# Patient Record
Sex: FEMALE | Race: BLACK OR AFRICAN AMERICAN | NOT HISPANIC OR LATINO | Employment: FULL TIME | URBAN - METROPOLITAN AREA
[De-identification: names, ages, dates, MRNs, and addresses within clinical notes are randomized per-mention and may not be internally consistent; named-entity substitution may affect disease eponyms.]

---

## 2024-08-19 ENCOUNTER — OFFICE VISIT (OUTPATIENT)
Dept: FAMILY MEDICINE | Facility: CLINIC | Age: 39
End: 2024-08-19
Payer: COMMERCIAL

## 2024-08-19 ENCOUNTER — PATIENT MESSAGE (OUTPATIENT)
Dept: FAMILY MEDICINE | Facility: CLINIC | Age: 39
End: 2024-08-19

## 2024-08-19 VITALS
BODY MASS INDEX: 34.44 KG/M2 | DIASTOLIC BLOOD PRESSURE: 84 MMHG | HEART RATE: 95 BPM | WEIGHT: 201.75 LBS | TEMPERATURE: 99 F | OXYGEN SATURATION: 99 % | HEIGHT: 64 IN | SYSTOLIC BLOOD PRESSURE: 132 MMHG

## 2024-08-19 DIAGNOSIS — Z12.31 SCREENING MAMMOGRAM FOR BREAST CANCER: ICD-10-CM

## 2024-08-19 DIAGNOSIS — L65.9 HAIR LOSS DISORDER: ICD-10-CM

## 2024-08-19 DIAGNOSIS — Z00.00 ENCOUNTER FOR ANNUAL GENERAL MEDICAL EXAMINATION WITHOUT ABNORMAL FINDINGS IN ADULT: Primary | ICD-10-CM

## 2024-08-19 DIAGNOSIS — M54.16 LUMBAR RADICULOPATHY, CHRONIC: ICD-10-CM

## 2024-08-19 DIAGNOSIS — M79.10 MYALGIA: ICD-10-CM

## 2024-08-19 DIAGNOSIS — G89.29 CHRONIC BILATERAL LOW BACK PAIN WITH BILATERAL SCIATICA: ICD-10-CM

## 2024-08-19 DIAGNOSIS — M54.42 CHRONIC BILATERAL LOW BACK PAIN WITH BILATERAL SCIATICA: ICD-10-CM

## 2024-08-19 DIAGNOSIS — F32.1 CURRENT MODERATE EPISODE OF MAJOR DEPRESSIVE DISORDER, UNSPECIFIED WHETHER RECURRENT: ICD-10-CM

## 2024-08-19 DIAGNOSIS — M25.562 LEFT ANTERIOR KNEE PAIN: ICD-10-CM

## 2024-08-19 DIAGNOSIS — J45.20 MILD INTERMITTENT ASTHMA WITHOUT COMPLICATION: ICD-10-CM

## 2024-08-19 DIAGNOSIS — M54.41 CHRONIC BILATERAL LOW BACK PAIN WITH BILATERAL SCIATICA: ICD-10-CM

## 2024-08-19 PROCEDURE — 99215 OFFICE O/P EST HI 40 MIN: CPT | Mod: S$GLB,,, | Performed by: STUDENT IN AN ORGANIZED HEALTH CARE EDUCATION/TRAINING PROGRAM

## 2024-08-19 PROCEDURE — 99999 PR PBB SHADOW E&M-EST. PATIENT-LVL V: CPT | Mod: PBBFAC,,, | Performed by: STUDENT IN AN ORGANIZED HEALTH CARE EDUCATION/TRAINING PROGRAM

## 2024-08-19 RX ORDER — IBUPROFEN 200 MG
200 TABLET ORAL EVERY 6 HOURS PRN
COMMUNITY

## 2024-08-19 RX ORDER — DULOXETIN HYDROCHLORIDE 60 MG/1
60 CAPSULE, DELAYED RELEASE ORAL DAILY
Qty: 30 CAPSULE | Refills: 1 | Status: SHIPPED | OUTPATIENT
Start: 2024-09-09 | End: 2024-11-08

## 2024-08-19 RX ORDER — MINOXIDIL 2 %
SOLUTION, NON-ORAL TOPICAL 2 TIMES DAILY
Qty: 60 ML | Refills: 4 | Status: SHIPPED | OUTPATIENT
Start: 2024-08-19 | End: 2025-02-15

## 2024-08-19 RX ORDER — ALBUTEROL SULFATE 90 UG/1
2 INHALANT RESPIRATORY (INHALATION) EVERY 6 HOURS PRN
Qty: 51 EACH | Refills: 2 | Status: SHIPPED | OUTPATIENT
Start: 2024-08-19 | End: 2025-08-19

## 2024-08-19 RX ORDER — BETAMETHASONE DIPROPIONATE 0.5 MG/G
CREAM TOPICAL 2 TIMES DAILY
Qty: 45 G | Refills: 4 | Status: SHIPPED | OUTPATIENT
Start: 2024-08-19 | End: 2025-02-15

## 2024-08-19 RX ORDER — DULOXETIN HYDROCHLORIDE 30 MG/1
30 CAPSULE, DELAYED RELEASE ORAL DAILY
Qty: 7 CAPSULE | Refills: 0 | Status: SHIPPED | OUTPATIENT
Start: 2024-08-19 | End: 2024-08-26

## 2024-08-20 RX ORDER — MELOXICAM 15 MG/1
15 TABLET ORAL DAILY
Qty: 30 TABLET | Refills: 5 | Status: SHIPPED | OUTPATIENT
Start: 2024-08-20 | End: 2025-02-16

## 2024-08-21 ENCOUNTER — TELEPHONE (OUTPATIENT)
Dept: OBSTETRICS AND GYNECOLOGY | Facility: CLINIC | Age: 39
End: 2024-08-21
Payer: COMMERCIAL

## 2024-08-21 NOTE — TELEPHONE ENCOUNTER
----- Message from Jany Mcclelland sent at 8/21/2024  3:06 PM CDT -----  Regarding: Sooner appt  Contact: pt  Type:  Sooner Appointment Request    Caller is requesting a sooner appointment.  Caller declined first available appointment listed below.  Caller will not accept being placed on the waitlist and is requesting a message be sent to doctor.    Name of Caller:pt    Best Call Back Number:[phone]    Additional Information: pt has an appt 8/30 at 2:00.  Pt asking for an earlier appt that day.  She has a conflict.

## 2024-08-22 ENCOUNTER — TELEPHONE (OUTPATIENT)
Dept: FAMILY MEDICINE | Facility: CLINIC | Age: 39
End: 2024-08-22
Payer: COMMERCIAL

## 2024-08-22 NOTE — TELEPHONE ENCOUNTER
Spoke to patient regarding scheduled imaging for today as insurance has not authorized yet. States she spoke with someone earlier regarding the same & provided additional  insurance. Was under the impression it was resolved. Will check back w/radiology for confirmation.

## 2024-08-30 ENCOUNTER — OFFICE VISIT (OUTPATIENT)
Dept: OBSTETRICS AND GYNECOLOGY | Facility: CLINIC | Age: 39
End: 2024-08-30
Payer: COMMERCIAL

## 2024-08-30 ENCOUNTER — HOSPITAL ENCOUNTER (OUTPATIENT)
Dept: RADIOLOGY | Facility: CLINIC | Age: 39
Discharge: HOME | End: 2024-08-30
Attending: STUDENT IN AN ORGANIZED HEALTH CARE EDUCATION/TRAINING PROGRAM
Payer: COMMERCIAL

## 2024-08-30 ENCOUNTER — TELEPHONE (OUTPATIENT)
Dept: FAMILY MEDICINE | Facility: CLINIC | Age: 39
End: 2024-08-30
Payer: COMMERCIAL

## 2024-08-30 VITALS
SYSTOLIC BLOOD PRESSURE: 110 MMHG | BODY MASS INDEX: 33.8 KG/M2 | DIASTOLIC BLOOD PRESSURE: 70 MMHG | HEIGHT: 64 IN | WEIGHT: 198 LBS

## 2024-08-30 DIAGNOSIS — Z12.31 SCREENING MAMMOGRAM FOR BREAST CANCER: ICD-10-CM

## 2024-08-30 DIAGNOSIS — M25.562 LEFT ANTERIOR KNEE PAIN: ICD-10-CM

## 2024-08-30 DIAGNOSIS — Z01.419 WELL WOMAN EXAM: Primary | ICD-10-CM

## 2024-08-30 PROCEDURE — 77067 SCR MAMMO BI INCL CAD: CPT | Mod: TC,PO

## 2024-08-30 PROCEDURE — 99999 PR PBB SHADOW E&M-EST. PATIENT-LVL III: CPT | Mod: PBBFAC,,, | Performed by: GENERAL PRACTICE

## 2024-08-30 PROCEDURE — 99386 PREV VISIT NEW AGE 40-64: CPT | Mod: S$GLB,,, | Performed by: GENERAL PRACTICE

## 2024-08-30 PROCEDURE — 73560 X-RAY EXAM OF KNEE 1 OR 2: CPT | Mod: 26,LT,, | Performed by: RADIOLOGY

## 2024-08-30 PROCEDURE — 73560 X-RAY EXAM OF KNEE 1 OR 2: CPT | Mod: TC,FY,PO,LT

## 2024-08-30 NOTE — TELEPHONE ENCOUNTER
----- Message from Malka Reveles sent at 8/30/2024 11:27 AM CDT -----  Regarding: CT Code    Good morning,        Mrs Jackson came in today. Her CT was denied due to issues with the code. Please give her a call to discuss options and possibly assist with getting her approved. Thank you

## 2024-08-30 NOTE — PROGRESS NOTES
HISTORY OF THE PRESENT ILLNESS    08/31/2024  Gabriel Martell is a 39 y.o. here for WWE, establishing care.  Moved back to [place] after having been in [place] for a while.  Recently saw Dr. Alex Jarrett who collected a PAP and dx'ed with a yeast infection.  Saw him b/c she was having a hard time getting scheduled with an [facility].    Recently established with Dr. Murillo who dx'ed her with depression (patient says more anxiety to her), and started on cymbalta.  Patient also concerned with hair loss - has a derm referral pending.  Also concerned that she hasn't been able to maintain a healthy weight as she has before.  Says she didn't get to explore this with Dr. Murillo as much as she would have liked to.  Wondering if it could be her hormones.    G'sP's: G0  LMP: 21 AUG  Relationship:  and sexually active  Contraception: Vasectomy  PAP Hx: no h/o abnormals  LAST PAP: PAP neg / Date: 6/28/2019 --> had one recently with Dr. Jarrett  MMG (-) = scheduled today     GYNECOLOGIC HISTORY  Cervical CA Screen / Infectious   PAP Hx: no h/o abnormals  Genital HSV: no  STD Hx: no past history   Bleeding   Menarche: 11 yoa  Period duration: 6 days  # Heavy Days: 4  Pad/tampon ? on heavy days: 4x  Intermenstrual bleeding: No  Period frequency:regular every 28-30 days   Pain   Dysmenorrhea: yes  Non-menstrual pelvic pressure/pain: Yes  Dyspareunia: Yes: only sometimes   Other   Vasomotor Sxs: denies  Vaginal dryness: denies     OBSTETRIC HISTORY  G0    PAST MEDICAL HISTORY  BMI 34  Fibroids  Anxiety / depression   Asthma    PAST SURGICAL HISTORY  ----------------------------    Breast biopsy    Colposcopy    Laproscopy    Laparotomy Myomectomy 2007  Lx for pain    ALLERGIES  Review of patient's allergies indicates:   Allergen Reactions    Pollen extracts Other (See Comments)     MEDICATIONS  Current Outpatient Medications   Medication Instructions    albuterol (PROVENTIL/VENTOLIN HFA) 90 mcg/actuation inhaler 2  puffs, Inhalation, Every 6 hours PRN    B.coagul,subtilis/inulin/vit C (CULTURELLE PROBIOTIC-PREBIOTIC ORAL)     betamethasone dipropionate 0.05 % cream Topical (Top), 2 times daily, Use with Minoxidil    [START ON 9/9/2024] DULoxetine (CYMBALTA) 60 mg, Oral, Daily    ibuprofen (ADVIL,MOTRIN) 200 mg, Oral, Every 6 hours PRN    loratadine 10 mg Cap     MELATONIN ORAL Oral    meloxicam (MOBIC) 15 mg, Oral, Daily, For back pain/Knee pain, take with food. Do not take with Ibuprofen/Motrin/Aleve    minoxidiL (ROGAINE) 2 % external solution Topical (Top), 2 times daily, For hair loss    multivitamin capsule 1 capsule, Oral, Daily     SOCIAL HISTORY  Lives with:   Smoker: non-smoker  Alcohol: denies  Drugs: denies  Domestic Violence: no  Occupation:  , out in the field by herself , physically demanding job    FAMILY HISTORY  BLEEDING or  CLOTTING DISORDERS: none  BREAST CA: none  UTERINE CA: none  OVARIAN CA: none  COLON CA: none    --------------------------------------------------------------------------------------------------------------    PHYSICAL EXAM  Vitals:    08/30/24 1013   BP: 110/70     GEN = alert/oriented, nad, pleasant  HEENT = sclera anicteric, EOM grossly normal  BREASTS = deferred, no concerns  CV = BP and HR as per vitals  PULM = normal respiratory effort   = deferred, no concerns and just had exam with Dr. Jarrett    ASSESSMENT AND PLAN:  39 y.o. for [employer] / establishing care.  Multiple non-GYN concerns as above.  Discussed that for weight problems I'll often order a TSH (already ordered by Dr. Murillo), but I generally don't diagnose or manage weight loss problems.  With her monthly menstrual cycles she is unlikely to have any abnormal hormone levels from a GYN perspective.  No indication for testing at this time.  Has CPP with Hx of pain scope and prior myomectomy.    Records request (mostly for PAP smear and any relevant findings/studies) from Dr. Jarrett's office  Cervical Cancer  screening:  awaiting report from recent PAP smear  Mammogram: scheduled soon  Encouraged self breast awareness; RTC for breast concerns  Encouraged reaching out to Dr. Murillo about next steps for weight loss eval/plan  Encouraged to schedule with Derm re hair loss  RTC for periodic GYN exam, sooner prliz Navarro MD

## 2024-09-03 ENCOUNTER — OFFICE VISIT (OUTPATIENT)
Dept: FAMILY MEDICINE | Facility: CLINIC | Age: 39
End: 2024-09-03
Payer: COMMERCIAL

## 2024-09-03 DIAGNOSIS — M25.561 CHRONIC PAIN OF RIGHT KNEE: ICD-10-CM

## 2024-09-03 DIAGNOSIS — M23.52 RECURRENT LEFT KNEE INSTABILITY: Primary | ICD-10-CM

## 2024-09-03 DIAGNOSIS — G89.29 CHRONIC PAIN OF RIGHT KNEE: ICD-10-CM

## 2024-09-03 PROCEDURE — 99213 OFFICE O/P EST LOW 20 MIN: CPT | Mod: 95,,, | Performed by: STUDENT IN AN ORGANIZED HEALTH CARE EDUCATION/TRAINING PROGRAM

## 2024-09-03 PROCEDURE — G2211 COMPLEX E/M VISIT ADD ON: HCPCS | Mod: 95,,, | Performed by: STUDENT IN AN ORGANIZED HEALTH CARE EDUCATION/TRAINING PROGRAM

## 2024-09-03 NOTE — PROGRESS NOTES
The patient location is:  [facility]  The chief complaint leading to consultation is:   Chief Complaint   Patient presents with    Left knee instability     Worsening left knee instability, popping clicking sound    Lab results     Results review     Total time spent with patient: 15 min     Visit type: Virtual visit with synchronous audio and video  Each patient to whom he or she provides medical services by telemedicine is:  (1) informed of the relationship between the physician and patient and the respective role of any other health care provider with respect to management of the patient; and (2) notified that he or she may decline to receive medical services by telemedicine and may withdraw from such care at any time.    This service was not originating from a related E/M service provided within the previous 7 days nor will  to an E/M service or procedure within the next 24 hours or my soonest available appointment.  Prevailing standard of care was able to be met in this synchronous audio and video visit    Subjective:    Chief Complaint:   Chief Complaint   Patient presents with    Left knee instability     Worsening left knee instability, popping clicking sound    Lab results     Results review     274}  HPI:  39 y.o. female reports worsening left knee instability.  Recent x-ray performed this negative for arthritis.  However she does continue to experience pain, a popping and clicking sound as well as difficulty with ambulation.     She also reports worsening right knee pain however symptoms are greatest on the left.    Labs reviewed and all concerns addressed.      The HPI and pertinent ROS is included in the Diagnostic Impression Remarks section at the end of the note. Please see below for further details.     The following portions of the patient's history were reviewed and updated as appropriate: allergies, current medications, past family history, past medical history, past social  "history, past surgical history and problem list.  Past Medical History:   Diagnosis Date    Abnormal Pap smear of cervix 2006    Asthma     Breast disorder      Past Surgical History:   Procedure Laterality Date    BREAST BIOPSY Left 2011    COLPOSCOPY      laproscopy        Social History  Social History     Tobacco Use    Smoking status: Never    Smokeless tobacco: Never   Substance Use Topics    Alcohol use: No    Drug use: No     Family History   Problem Relation Name Age of Onset    Diabetes Father       Review of patient's allergies indicates:   Allergen Reactions    Pollen extracts Other (See Comments)     274}  Physical Examination  LMP 08/21/2024 (Exact Date)   Wt Readings from Last 3 Encounters:   08/30/24 89.8 kg (197 lb 15.6 oz)   08/19/24 91.5 kg (201 lb 11.5 oz)   08/23/19 73.1 kg (161 lb 2.5 oz)     BP Readings from Last 3 Encounters:   08/30/24 110/70   08/19/24 132/84   08/23/19 116/67     Estimated body mass index is 33.98 kg/m² as calculated from the following:    Height as of 8/30/24: 5' 4" (1.626 m).    Weight as of 8/30/24: 89.8 kg (197 lb 15.6 oz).       CONSTITUTIONAL: No apparent distress. Does not appear acutely ill or septic. Appears adequately hydrated.  PULM: Breathing unlabored.  PSYCHIATRIC: Alert and conversant and grossly oriented. Mood is grossly neutral. Affect appropriate. Judgment and insight grossly intact.  Integument: normal coloration and turgor, no rashes, no suspicious skin lesions noted.    Data reviewed  Previous medical records reviewed and summarized in HPI.   274}    Laboratory  I have reviewed old labs below:  Lab Results   Component Value Date    WBC 4.22 08/30/2024    HGB 13.1 08/30/2024    HCT 41.7 08/30/2024    MCV 91 08/30/2024     08/30/2024     08/30/2024    K 4.2 08/30/2024     08/30/2024    CALCIUM 9.6 08/30/2024    CO2 26 08/30/2024    GLU 88 08/30/2024    BUN 13 08/30/2024    CREATININE 1.0 08/30/2024    ANIONGAP 8 08/30/2024    " "ESTGFRAFRICA >60 08/23/2019    EGFRNONAA >60 08/23/2019    PROT 7.8 08/30/2024    ALBUMIN 4.2 08/30/2024    BILITOT 0.5 08/30/2024    ALKPHOS 66 08/30/2024    ALT 10 08/30/2024    AST 20 08/30/2024    CHOL 159 08/30/2024    TRIG 52 08/30/2024    HDL 51 08/30/2024    LDLCALC 97.6 08/30/2024    TSH 0.878 08/30/2024    HGBA1C 5.2 08/30/2024     Lab reviewed by me: Particular labs of significance that I will monitor, workup, or treat to improve are mentioned below in diagnostic impression remarks.  274}  Imaging/EKG: I have reviewed the pertinent results/findings and my personal findings are noted below in diagnostic impression remarks.     Assessment/Plan  Gabriel Dong [facility] COLEYazanl is a 39 y.o. female who presents to clinic with:    1. Recurrent left knee instability    2. Chronic pain of right knee         Diagnostic Impression Remarks + HPI     Documentation entered by me for this encounter may have been done in part using speech-recognition technology. Although I have made an effort to ensure accuracy, "sound like" errors may exist and should be interpreted in context.           This is the extent of the patient's complaints at this present time. She denies chest pain upon exertion, dyspnea, nausea, vomiting, diaphoresis, and syncope. No pleuritic chest pain, unilateral leg swelling, calf tenderness, or calf pain.     Gabriel will return to clinic in a few months for further workup and reassessment or sooner as needed. She was instructed to call the clinic or go to the emergency department if her symptoms do not improve, worsens, or if new symptoms develop. As we discussed that symptoms could worsen over the next 24 hours she was advised that if any increased swelling, pain, or numbness arise to go immediately to the ED. Patient knows to call any time if an emergency arises. Shared decision making occurred and she verbalized understanding in agreement with this plan.   274}  BMI Goal    Counseled patient on " "her ideal body weight, health consequences of being obese and current recommendations including weekly exercise and a heart healthy diet.  She is aware that ideal BMI < 25  Estimated body mass index is 33.98 kg/m² as calculated from the following:    Height as of 8/30/24: 5' 4" (1.626 m).    Weight as of 8/30/24: 89.8 kg (197 lb 15.6 oz).     She was counseled about the importance of healthy dietary habits as well as routine physical activity and exercise for better health outcomes. I also discussed the importance of cancer screening.     Medication Monitoring    In today's visit, monitoring for drug toxicity was accomplished. Proper use of medications was also discussed.     Counseling    Eat Less Unhealthy Fat   -Cut back on saturated fats and trans (also called hydrogenated) fats. A diet thats high in these fats increases your bad cholesterol. Its not enough to just cut back on foods containing cholesterol.   -Eat about 2 servings of fish per week. Most fish contain omega-3 fatty acids. These help lower blood cholesterol.   -Eat more whole grains and soluble fiber (such as oat bran). These lower overall cholesterol.   -Counseled that most cholesterol is made in the liver and only a minority comes from diet.    Be Active   -Choose an activity you enjoy. Walking, swimming, and riding a bike are some good ways to be active.   -Start at a level where you feel comfortable. Increase your time and pace a little each week.   -Work up to 30 minutes on most days. You can break this up into three 10-minute periods.   -Remember, some activity is better than none.   -If you havent been exercising regularly, start slowly. Check with your doctor to make sure the exercise plan is right for you.     Take Medication As Directed: Many patients need medication to get their LDL levels to a safe level. Medication to lower cholesterol levels is effective and safe. (But taking medication is not a substitute for exercise or watching " your diet!).    I discussed imaging findings, diagnosis, possibilities, treatment options, medications, risks, and benefits. She had many questions regarding the options and long-term effects. All questions were answered. She expressed understanding after counseling regarding the diagnosis and recommendations. She was capable and demonstrated competence with understanding of these options. Shared decision making was performed resulting in her choosing the current treatment plan.     I also discussed the importance of close follow up to discuss labs, change or modify her medications if needed, monitor side effects, and further evaluation of medical problems.     Additional workup planned: see labs ordered below.    See below for labs and meds ordered with associated diagnosis    Recurrent left knee instability  Comments:  Worsening left knee pain with instability, +popping/clicking.  X-ray neg.  However noted possible patellofemoral tracking.  MRI ordered, ortho referral  Orders:  -     MRI Knee Without Contrast Left; Future; Expected date: 09/03/2024  -     Ambulatory referral/consult to Orthopedics; Future; Expected date: 09/04/2024    Chronic pain of right knee  -     Ambulatory referral/consult to Orthopedics; Future; Expected date: 09/04/2024  -     X-ray Knee Ortho Right with Flexion; Future; Expected date: 09/03/2024            Medication List with Changes/Refills   Current Medications    ALBUTEROL (PROVENTIL/VENTOLIN HFA) 90 MCG/ACTUATION INHALER    Inhale 2 puffs into the lungs every 6 (six) hours as needed for Wheezing.    B.COAGUL,SUBTILIS/INULIN/VIT C (CULTURELLE PROBIOTIC-PREBIOTIC ORAL)        BETAMETHASONE DIPROPIONATE 0.05 % CREAM    Apply topically 2 (two) times daily. Use with Minoxidil    DULOXETINE (CYMBALTA) 60 MG CAPSULE    Take 1 capsule (60 mg total) by mouth once daily.    IBUPROFEN (ADVIL,MOTRIN) 200 MG TABLET    Take 200 mg by mouth every 6 (six) hours as needed for Pain.    LORATADINE 10  "MG CAP        MELATONIN ORAL    Take by mouth.    MELOXICAM (MOBIC) 15 MG TABLET    Take 1 tablet (15 mg total) by mouth once daily. For back pain/Knee pain, take with food. Do not take with Ibuprofen/Motrin/Aleve    MINOXIDIL (ROGAINE) 2 % EXTERNAL SOLUTION    Apply topically 2 (two) times daily. For hair loss    MULTIVITAMIN CAPSULE    Take 1 capsule by mouth once daily.     Modified Medications    No medications on file       Sally Murillo DO  09/03/2024     Documentation entered by me for this encounter may have been done in part using speech-recognition technology. Although I have made an effort to ensure accuracy, "sound like" errors may exist and should be interpreted in context.  "

## 2024-09-09 DIAGNOSIS — R92.8 ABNORMAL MAMMOGRAM: Primary | ICD-10-CM

## 2024-09-10 ENCOUNTER — TELEPHONE (OUTPATIENT)
Dept: FAMILY MEDICINE | Facility: CLINIC | Age: 39
End: 2024-09-10
Payer: COMMERCIAL

## 2024-09-10 NOTE — TELEPHONE ENCOUNTER
----- Message from Bonnie Murillo DO sent at 9/9/2024  4:05 PM CDT -----  Please inform patient that her mammogram results were reviewed and she will need to also complete a left breast diagnostic mammogram and ultrasound for further evaluation.  I have placed the order for the imaging to be completed.

## 2024-09-11 ENCOUNTER — PATIENT MESSAGE (OUTPATIENT)
Dept: FAMILY MEDICINE | Facility: CLINIC | Age: 39
End: 2024-09-11
Payer: COMMERCIAL

## 2024-09-13 NOTE — TELEPHONE ENCOUNTER
No problem.  I do not have any further recommendations this is an insurance process.  I would say when you do schedule the MRI, just to be certain that it is done at least a week in advance of your orthopedics appointment

## 2024-09-18 DIAGNOSIS — M62.830 MUSCLE SPASM OF BACK: ICD-10-CM

## 2024-09-18 DIAGNOSIS — M54.41 CHRONIC BILATERAL LOW BACK PAIN WITH BILATERAL SCIATICA: Primary | ICD-10-CM

## 2024-09-18 DIAGNOSIS — G89.29 CHRONIC BILATERAL LOW BACK PAIN WITH BILATERAL SCIATICA: Primary | ICD-10-CM

## 2024-09-18 DIAGNOSIS — M54.42 CHRONIC BILATERAL LOW BACK PAIN WITH BILATERAL SCIATICA: Primary | ICD-10-CM

## 2024-09-18 RX ORDER — IBUPROFEN 800 MG/1
800 TABLET ORAL EVERY 6 HOURS PRN
Qty: 90 TABLET | Refills: 0 | Status: SHIPPED | OUTPATIENT
Start: 2024-09-18 | End: 2024-10-18

## 2024-09-18 RX ORDER — METHOCARBAMOL 500 MG/1
500 TABLET, FILM COATED ORAL 4 TIMES DAILY
Qty: 40 TABLET | Refills: 0 | Status: SHIPPED | OUTPATIENT
Start: 2024-09-18 | End: 2024-09-28

## 2024-09-19 ENCOUNTER — TELEPHONE (OUTPATIENT)
Dept: FAMILY MEDICINE | Facility: CLINIC | Age: 39
End: 2024-09-19
Payer: COMMERCIAL

## 2024-09-19 ENCOUNTER — HOSPITAL ENCOUNTER (OUTPATIENT)
Dept: RADIOLOGY | Facility: HOSPITAL | Age: 39
Discharge: HOME | End: 2024-09-19
Attending: STUDENT IN AN ORGANIZED HEALTH CARE EDUCATION/TRAINING PROGRAM
Payer: COMMERCIAL

## 2024-09-19 DIAGNOSIS — M23.52 RECURRENT LEFT KNEE INSTABILITY: ICD-10-CM

## 2024-09-19 DIAGNOSIS — M25.561 CHRONIC PAIN OF RIGHT KNEE: ICD-10-CM

## 2024-09-19 DIAGNOSIS — G89.29 CHRONIC PAIN OF RIGHT KNEE: Primary | ICD-10-CM

## 2024-09-19 DIAGNOSIS — G89.29 CHRONIC PAIN OF RIGHT KNEE: ICD-10-CM

## 2024-09-19 DIAGNOSIS — M25.561 CHRONIC PAIN OF RIGHT KNEE: Primary | ICD-10-CM

## 2024-09-19 PROCEDURE — 73564 X-RAY EXAM KNEE 4 OR MORE: CPT | Mod: 26,RT,, | Performed by: RADIOLOGY

## 2024-09-19 PROCEDURE — 73721 MRI JNT OF LWR EXTRE W/O DYE: CPT | Mod: 26,LT,, | Performed by: RADIOLOGY

## 2024-09-19 PROCEDURE — 73564 X-RAY EXAM KNEE 4 OR MORE: CPT | Mod: TC,PO,RT

## 2024-09-19 PROCEDURE — 73562 X-RAY EXAM OF KNEE 3: CPT | Mod: 26,59,LT, | Performed by: RADIOLOGY

## 2024-09-19 PROCEDURE — 73562 X-RAY EXAM OF KNEE 3: CPT | Mod: TC,PO,LT

## 2024-09-19 PROCEDURE — 73721 MRI JNT OF LWR EXTRE W/O DYE: CPT | Mod: TC,PO,LT

## 2024-09-19 NOTE — PROGRESS NOTES
Please inform patient of the following:    The MRI of her left knee has been reviewed.  It is concerning for a strain of the medial collateral ligaments and degeneration of the kneecap and knee joint.  I do recommend keeping scheduled appointment for orthopedic provider.  This provider will further instruct you on the treatment for this condition.    If you have any further questions please contact our office    Sincerely,    Sally Murillo, DO

## 2024-09-19 NOTE — TELEPHONE ENCOUNTER
----- Message from Bonnie Murillo DO sent at 9/19/2024 12:33 PM CDT -----  Please inform patient that the       X-ray imaging results of your right knee were normal.  They are not indicative of fracture or dislocation.  However given your symptoms and progressive nature of knee pain, we may need to attempt MRI study of this knee as well.  I have placed these orders for you.  I do recommend continuing follow up with Orthopedics for further evaluation of this concern as well.      If there are any further questions, please contact our office

## 2024-09-19 NOTE — TELEPHONE ENCOUNTER
----- Message from Sally Murillo DO sent at 9/19/2024 12:25 PM CDT -----  Please inform patient of the following:    The MRI of her left knee has been reviewed.  It is concerning for a strain of the medial collateral ligaments and degeneration of the kneecap and knee joint.  I do recommend keeping scheduled appointment for orthopedic provider.  This provider will further instruct you on the treatment for this condition.    If you have any further questions please contact our office    Sincerely,    Sally Murillo DO

## 2024-09-23 ENCOUNTER — OFFICE VISIT (OUTPATIENT)
Dept: ORTHOPEDICS | Facility: CLINIC | Age: 39
End: 2024-09-23
Payer: COMMERCIAL

## 2024-09-23 VITALS — BODY MASS INDEX: 33.8 KG/M2 | HEIGHT: 64 IN | WEIGHT: 198 LBS

## 2024-09-23 DIAGNOSIS — G89.29 CHRONIC PAIN OF BOTH KNEES: ICD-10-CM

## 2024-09-23 DIAGNOSIS — M25.562 CHRONIC PAIN OF BOTH KNEES: ICD-10-CM

## 2024-09-23 DIAGNOSIS — M22.40 CHONDROMALACIA OF PATELLA, UNSPECIFIED LATERALITY: ICD-10-CM

## 2024-09-23 DIAGNOSIS — M25.561 CHRONIC PAIN OF BOTH KNEES: ICD-10-CM

## 2024-09-23 DIAGNOSIS — M17.10 ARTHRITIS OF KNEE: Primary | ICD-10-CM

## 2024-09-23 PROCEDURE — 99999 PR PBB SHADOW E&M-EST. PATIENT-LVL II: CPT | Mod: PBBFAC,,, | Performed by: ORTHOPAEDIC SURGERY

## 2024-09-23 PROCEDURE — 99204 OFFICE O/P NEW MOD 45 MIN: CPT | Mod: S$GLB,,, | Performed by: ORTHOPAEDIC SURGERY

## 2024-09-23 NOTE — PROGRESS NOTES
Past Medical History:   Diagnosis Date    Abnormal Pap smear of cervix 2006    Asthma     Breast disorder        Past Surgical History:   Procedure Laterality Date    BREAST BIOPSY Left 2011    COLPOSCOPY      laproscopy          Current Outpatient Medications   Medication Sig    albuterol (PROVENTIL/VENTOLIN HFA) 90 mcg/actuation inhaler Inhale 2 puffs into the lungs every 6 (six) hours as needed for Wheezing.    B.coagul,subtilis/inulin/vit C (CULTURELLE PROBIOTIC-PREBIOTIC ORAL)     betamethasone dipropionate 0.05 % cream Apply topically 2 (two) times daily. Use with Minoxidil    DULoxetine (CYMBALTA) 60 MG capsule Take 1 capsule (60 mg total) by mouth once daily.    ibuprofen (ADVIL,MOTRIN) 800 MG tablet Take 1 tablet (800 mg total) by mouth every 6 (six) hours as needed for Pain or Temperature greater than.    loratadine 10 mg Cap     MELATONIN ORAL Take by mouth.    meloxicam (MOBIC) 15 MG tablet Take 1 tablet (15 mg total) by mouth once daily. For back pain/Knee pain, take with food. Do not take with Ibuprofen/Motrin/Aleve    methocarbamoL (ROBAXIN) 500 MG Tab Take 1 tablet (500 mg total) by mouth 4 (four) times daily. for 10 days    minoxidiL (ROGAINE) 2 % external solution Apply topically 2 (two) times daily. For hair loss    multivitamin capsule Take 1 capsule by mouth once daily.     No current facility-administered medications for this visit.       Review of patient's allergies indicates:   Allergen Reactions    Pollen extracts Other (See Comments)       Family History   Problem Relation Name Age of Onset    Diabetes Father         Social History     Socioeconomic History    Marital status:    Tobacco Use    Smoking status: Never    Smokeless tobacco: Never   Substance and Sexual Activity    Alcohol use: No    Drug use: No    Sexual activity: Yes     Partners: Male     Birth control/protection: None     Social Determinants of Health     Financial Resource Strain: Low Risk  (8/19/2024)    Overall  Financial Resource Strain (CARDIA)     Difficulty of Paying Living Expenses: Not hard at all   Food Insecurity: No Food Insecurity (8/19/2024)    Hunger Vital Sign     Worried About Running Out of Food in the Last Year: Never true     Ran Out of Food in the Last Year: Never true   Physical Activity: Unknown (8/19/2024)    Exercise Vital Sign     Days of Exercise per Week: 5 days   Stress: Stress Concern Present (8/19/2024)    [employer] - Occupational Stress Questionnaire     Feeling of Stress : Very much   Housing Stability: Unknown (8/19/2024)    Housing Stability Vital Sign     Unable to Pay for Housing in the Last Year: No       Chief Complaint:   Chief Complaint   Patient presents with    Right Knee - Pain    Left Knee - Pain       History of present illness:  39-year-old female seen for bilateral knee pain.  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  Pain started years ago.  Knee feels unstable.  Patient hears popping and rubbing.  Pain along the front of her knee.  She has tried over-the-counter braces which do not help.  Pain is a 6/10.  Had an MRI done recently which just show some early chondromalacia of the patella.        Review of Systems:    Constitution: Negative for chills, fever, and sweats.  Negative for unexplained weight loss.    HENT:  Negative for headaches and blurry vision.    Cardiovascular:Negative for chest pain or irregular heart beat. Negative for hypertension.    Respiratory:  Negative for cough and shortness of breath.    Gastrointestinal: Negative for abdominal pain, heartburn, melena, nausea, and vomitting.    Genitourinary:  Negative bladder incontinence and dysuria.    Musculoskeletal:  See HPI    Neurological: Negative for numbness.    Psychiatric/Behavioral: Negative for depression.  The patient is not nervous/anxious.      Endocrine: Negative for polyuria    Hematologic/Lymphatic: Negative for bleeding problem.  Does not bruise/bleed  easily.    Skin: Negative for poor would healing and rash      Physical Examination:    Vital Signs:  There were no vitals filed for this visit.    Body mass index is 33.98 kg/m².    This a well-developed, well nourished patient in no acute distress.  They are alert and oriented and cooperative to examination.  Pt. walks without an antalgic gait.      Examination of bilateral knees shows no rashes or erythema. There are no masses ecchymosis or effusion. Patient has full range of motion from 0-130°. Patient is nontender to palpation over lateral joint line and nontender to palpation over the medial joint line. Patient has a - Lachman exam, - anterior drawer exam, and - posterior drawer exam. - Apley exam. Knee is stable to varus and valgus stress. 5 out of 5 motor strength. Palpable distal pulses. Intact light touch sensation. Negative Patellofemoral crepitus.  Mild lateral tilt      X-rays:  X-rays of the right knee is ordered and reviewed which shows mild lateral tilt of the patella    MRI of the left knee is reviewed and interpreted:1. There is trace fluid superficial to the patellar tendon likely reflecting mild peritendinitis.  2. Mild sprain of the MCL.  3. Focal near full thickness fissure of the medial patellar facet articular cartilage with adjacent subchondral cystic degeneration of the patella.         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  Recommended physical therapy for patellofemoral syndrome.  Also discussed how she might be a good candidate for hyaluronic acid if they continue to bother her.  Follow up in 6 weeks.            All previous pertinent notes including ER visits, physical therapy visits, other orthopedic visits as well as other care for the same musculoskeletal problem were reviewed.  All pertinent lab values and previous imaging was reviewed pertinent to the current visit.    This note was created using YULIA  Modal voice recognition software that occasionally misinterpreted phrases or words.    Consult note is delivered via Epic messaging service.

## 2024-09-27 ENCOUNTER — HOSPITAL ENCOUNTER (OUTPATIENT)
Dept: RADIOLOGY | Facility: HOSPITAL | Age: 39
Discharge: HOME | End: 2024-09-27
Payer: COMMERCIAL

## 2024-09-27 ENCOUNTER — TELEPHONE (OUTPATIENT)
Dept: FAMILY MEDICINE | Facility: CLINIC | Age: 39
End: 2024-09-27
Payer: COMMERCIAL

## 2024-09-27 ENCOUNTER — OFFICE VISIT (OUTPATIENT)
Dept: FAMILY MEDICINE | Facility: CLINIC | Age: 39
End: 2024-09-27
Payer: COMMERCIAL

## 2024-09-27 ENCOUNTER — CLINICAL SUPPORT (OUTPATIENT)
Dept: REHABILITATION | Facility: HOSPITAL | Age: 39
End: 2024-09-27
Attending: ORTHOPAEDIC SURGERY
Payer: COMMERCIAL

## 2024-09-27 VITALS
BODY MASS INDEX: 33 KG/M2 | OXYGEN SATURATION: 96 % | SYSTOLIC BLOOD PRESSURE: 102 MMHG | HEART RATE: 49 BPM | RESPIRATION RATE: 18 BRPM | WEIGHT: 193.31 LBS | DIASTOLIC BLOOD PRESSURE: 68 MMHG | HEIGHT: 64 IN | TEMPERATURE: 97 F

## 2024-09-27 DIAGNOSIS — M22.40 CHONDROMALACIA OF PATELLA, UNSPECIFIED LATERALITY: ICD-10-CM

## 2024-09-27 DIAGNOSIS — M17.10 ARTHRITIS OF KNEE: ICD-10-CM

## 2024-09-27 DIAGNOSIS — R06.2 WHEEZING: ICD-10-CM

## 2024-09-27 DIAGNOSIS — R09.81 NASAL CONGESTION: ICD-10-CM

## 2024-09-27 DIAGNOSIS — R05.1 ACUTE COUGH: ICD-10-CM

## 2024-09-27 DIAGNOSIS — J40 BRONCHITIS: ICD-10-CM

## 2024-09-27 DIAGNOSIS — J18.9 PNEUMONIA OF RIGHT UPPER LOBE DUE TO INFECTIOUS ORGANISM: Primary | ICD-10-CM

## 2024-09-27 DIAGNOSIS — R05.1 ACUTE COUGH: Primary | ICD-10-CM

## 2024-09-27 PROCEDURE — 71046 X-RAY EXAM CHEST 2 VIEWS: CPT | Mod: TC

## 2024-09-27 PROCEDURE — 97530 THERAPEUTIC ACTIVITIES: CPT | Mod: PN

## 2024-09-27 PROCEDURE — 71046 X-RAY EXAM CHEST 2 VIEWS: CPT | Mod: 26,,, | Performed by: RADIOLOGY

## 2024-09-27 PROCEDURE — 97161 PT EVAL LOW COMPLEX 20 MIN: CPT | Mod: PN

## 2024-09-27 PROCEDURE — 99999 PR PBB SHADOW E&M-EST. PATIENT-LVL V: CPT | Mod: PBBFAC,,,

## 2024-09-27 RX ORDER — PREDNISONE 20 MG/1
20 TABLET ORAL 2 TIMES DAILY
Qty: 10 TABLET | Refills: 0 | Status: SHIPPED | OUTPATIENT
Start: 2024-09-27 | End: 2024-10-02

## 2024-09-27 RX ORDER — BENZONATATE 200 MG/1
200 CAPSULE ORAL 3 TIMES DAILY PRN
Qty: 30 CAPSULE | Refills: 0 | Status: SHIPPED | OUTPATIENT
Start: 2024-09-27 | End: 2024-10-07

## 2024-09-27 RX ORDER — CETIRIZINE HYDROCHLORIDE 10 MG/1
10 TABLET ORAL DAILY
Qty: 30 TABLET | Refills: 0 | Status: SHIPPED | OUTPATIENT
Start: 2024-09-27 | End: 2025-09-27

## 2024-09-27 RX ORDER — FLUTICASONE PROPIONATE 50 MCG
1 SPRAY, SUSPENSION (ML) NASAL DAILY
Qty: 16 G | Refills: 0 | Status: SHIPPED | OUTPATIENT
Start: 2024-09-27

## 2024-09-27 RX ORDER — DOXYCYCLINE 100 MG/1
100 CAPSULE ORAL EVERY 12 HOURS
Qty: 20 CAPSULE | Refills: 0 | Status: SHIPPED | OUTPATIENT
Start: 2024-09-27 | End: 2024-10-07

## 2024-09-27 RX ORDER — PROMETHAZINE HYDROCHLORIDE AND DEXTROMETHORPHAN HYDROBROMIDE 6.25; 15 MG/5ML; MG/5ML
5 SYRUP ORAL EVERY 6 HOURS PRN
Qty: 200 ML | Refills: 0 | Status: SHIPPED | OUTPATIENT
Start: 2024-09-27 | End: 2024-10-07

## 2024-09-27 NOTE — PROGRESS NOTES
Your xray shows pneumonia in your right lung.  We need to follow up in a couple of weeks with a repeat xray.  I have placed the order.

## 2024-09-27 NOTE — PROGRESS NOTES
Subjective:       Patient ID: Gabriel Salvador is a 39 y.o. female.    Chief Complaint: Cough (Blood in sputum)    Gabriel Salvador is a 39-year-old female patient who presents to clinic with complaints of persistent cough x7 days.  Patient states she has had severe coughing for the last week.  Her cough is productive of dark yellow/green phlegm.  She is feeling a rattling in her chest in her chest hurts when she coughs.  On 2 occasions yesterday she felt like she coughed up some kind of tissue out of her chest.  She has also seen small amounts of blood in her sputum.  She is waking up all night due to her cough.  She states she coughs so hard sometimes she feels like she is going to vomit.  She does have some nasal drainage and finds it hard to breathe out of 1 side of her nose.  She is also having headaches.  She does have a history of bronchial asthma and has an albuterol inhaler which she has been using.  She has tried Delsym, Mucinex, DayQuil, NyQuil and Mucinex with no relief.  She has had no fever or chills.        Review of patient's allergies indicates:   Allergen Reactions    Pollen extracts Other (See Comments)     Social Determinants of Health     Tobacco Use: Low Risk  (9/27/2024)    Patient History     Smoking Tobacco Use: Never     Smokeless Tobacco Use: Never     Passive Exposure: Not on file   Alcohol Use: Not At Risk (8/19/2024)    AUDIT-C     Frequency of Alcohol Consumption: Monthly or less     Average Number of Drinks: 1 or 2     Frequency of Binge Drinking: Never   Financial Resource Strain: Low Risk  (8/19/2024)    Overall Financial Resource Strain (CARDIA)     Difficulty of Paying Living Expenses: Not hard at all   Food Insecurity: No Food Insecurity (8/19/2024)    Hunger Vital Sign     Worried About Running Out of Food in the Last Year: Never true     Ran Out of Food in the Last Year: Never true   Transportation Needs: Not on file   Physical Activity: Unknown  (8/19/2024)    Exercise Vital Sign     Days of Exercise per Week: 5 days     Minutes of Exercise per Session: Not on file   Stress: Stress Concern Present (8/19/2024)    [employer] - Occupational Stress Questionnaire     Feeling of Stress : Very much   Housing Stability: Unknown (8/19/2024)    Housing Stability Vital Sign     Unable to Pay for Housing in the Last Year: No     Homeless in the Last Year: Not on file   Depression: High Risk (8/19/2024)    Depression     Last PHQ-4: Flowsheet Data: 14   Utilities: Not At Risk (8/19/2024)    [facility] Utilities     Threatened with loss of utilities: No   Health Literacy: Adequate Health Literacy (8/19/2024)     Health Literacy     Frequency of need for help with medical instructions: Never   Social Isolation: Not on file      Past Medical History:   Diagnosis Date    Abnormal Pap smear of cervix 2006    Asthma     Breast disorder       Past Surgical History:   Procedure Laterality Date    BREAST BIOPSY Left 2011    COLPOSCOPY      laproscopy         Social History     Socioeconomic History    Marital status:          Current Outpatient Medications:     albuterol (PROVENTIL/VENTOLIN HFA) 90 mcg/actuation inhaler, Inhale 2 puffs into the lungs every 6 (six) hours as needed for Wheezing., Disp: 51 each, Rfl: 2    B.coagul,subtilis/inulin/vit C (CULTURELLE PROBIOTIC-PREBIOTIC ORAL), , Disp: , Rfl:     ibuprofen (ADVIL,MOTRIN) 800 MG tablet, Take 1 tablet (800 mg total) by mouth every 6 (six) hours as needed for Pain or Temperature greater than., Disp: 90 tablet, Rfl: 0    loratadine 10 mg Cap, , Disp: , Rfl:     multivitamin capsule, Take 1 capsule by mouth once daily., Disp: , Rfl:     benzonatate (TESSALON) 200 MG capsule, Take 1 capsule (200 mg total) by mouth 3 (three) times daily as needed for Cough., Disp: 30 capsule, Rfl: 0    betamethasone dipropionate 0.05 % cream, Apply topically 2 (two) times daily. Use with Minoxidil (Patient  not taking: Reported on 9/27/2024), Disp: 45 g, Rfl: 4    cetirizine (ZYRTEC) 10 MG tablet, Take 1 tablet (10 mg total) by mouth once daily., Disp: 30 tablet, Rfl: 0    doxycycline (VIBRAMYCIN) 100 MG Cap, Take 1 capsule (100 mg total) by mouth every 12 (twelve) hours. for 10 days, Disp: 20 capsule, Rfl: 0    DULoxetine (CYMBALTA) 60 MG capsule, Take 1 capsule (60 mg total) by mouth once daily. (Patient not taking: Reported on 9/27/2024), Disp: 30 capsule, Rfl: 1    fluticasone propionate (FLONASE) 50 mcg/actuation nasal spray, 1 spray (50 mcg total) by Each Nostril route once daily., Disp: 16 g, Rfl: 0    MELATONIN ORAL, Take by mouth. (Patient not taking: Reported on 9/27/2024), Disp: , Rfl:     meloxicam (MOBIC) 15 MG tablet, Take 1 tablet (15 mg total) by mouth once daily. For back pain/Knee pain, take with food. Do not take with Ibuprofen/Motrin/Aleve (Patient not taking: Reported on 9/27/2024), Disp: 30 tablet, Rfl: 5    methocarbamoL (ROBAXIN) 500 MG Tab, Take 1 tablet (500 mg total) by mouth 4 (four) times daily. for 10 days (Patient not taking: Reported on 9/27/2024), Disp: 40 tablet, Rfl: 0    minoxidiL (ROGAINE) 2 % external solution, Apply topically 2 (two) times daily. For hair loss (Patient not taking: Reported on 9/27/2024), Disp: 60 mL, Rfl: 4    predniSONE (DELTASONE) 20 MG tablet, Take 1 tablet (20 mg total) by mouth 2 (two) times daily. for 5 days, Disp: 10 tablet, Rfl: 0    promethazine-dextromethorphan (PROMETHAZINE-DM) 6.25-15 mg/5 mL Syrp, Take 5 mLs by mouth every 6 (six) hours as needed (cough)., Disp: 200 mL, Rfl: 0    Lab Results   Component Value Date    WBC 4.22 08/30/2024    HGB 13.1 08/30/2024    HCT 41.7 08/30/2024     08/30/2024    CHOL 159 08/30/2024    TRIG 52 08/30/2024    HDL 51 08/30/2024    ALT 10 08/30/2024    AST 20 08/30/2024     08/30/2024    K 4.2 08/30/2024     08/30/2024    CREATININE 1.0 08/30/2024    BUN 13 08/30/2024    CO2 26 08/30/2024    TSH  0.878 08/30/2024    HGBA1C 5.2 08/30/2024       Review of Systems   Constitutional:  Positive for fatigue. Negative for chills and fever.   HENT:  Positive for nasal congestion, sinus pressure/congestion and sore throat. Negative for ear pain and trouble swallowing.         Ear fullness   Eyes: Negative.    Respiratory:  Positive for cough, shortness of breath and wheezing. Negative for chest tightness.    Cardiovascular:  Negative for chest pain, palpitations and leg swelling.   Gastrointestinal: Negative.    Genitourinary: Negative.    Psychiatric/Behavioral: Negative.         Objective:      Physical Exam  Constitutional:       General: She is not in acute distress.     Appearance: She is ill-appearing.   HENT:      Right Ear: Tympanic membrane normal.      Left Ear: Tympanic membrane normal.      Nose: Congestion present.      Mouth/Throat:      Pharynx: Posterior oropharyngeal erythema present.   Eyes:      Conjunctiva/sclera: Conjunctivae normal.   Cardiovascular:      Rate and Rhythm: Normal rate and regular rhythm.      Pulses: Normal pulses.      Heart sounds: Normal heart sounds.   Pulmonary:      Breath sounds: Wheezing present.      Comments: Frequent cough during visit  Neurological:      Mental Status: She is alert.   Psychiatric:         Mood and Affect: Mood normal.         Behavior: Behavior normal.         Thought Content: Thought content normal.         Judgment: Judgment normal.         Assessment:       1. Acute cough    2. Nasal congestion    3. Wheezing    4. Bronchitis        Plan:       Gabriel was seen today for cough.    Diagnoses and all orders for this visit:    Acute cough  -     X-Ray Chest PA And Lateral; Future  -     promethazine-dextromethorphan (PROMETHAZINE-DM) 6.25-15 mg/5 mL Syrp; Take 5 mLs by mouth every 6 (six) hours as needed (cough).  -     benzonatate (TESSALON) 200 MG capsule; Take 1 capsule (200 mg total) by mouth 3 (three) times daily as needed for Cough.    Nasal  congestion  -     fluticasone propionate (FLONASE) 50 mcg/actuation nasal spray; 1 spray (50 mcg total) by Each Nostril route once daily.  -     cetirizine (ZYRTEC) 10 MG tablet; Take 1 tablet (10 mg total) by mouth once daily.    Wheezing  -     predniSONE (DELTASONE) 20 MG tablet; Take 1 tablet (20 mg total) by mouth 2 (two) times daily. for 5 days    Bronchitis  -     doxycycline (VIBRAMYCIN) 100 MG Cap; Take 1 capsule (100 mg total) by mouth every 12 (twelve) hours. for 10 days    Have chest x-ray today.  Start on doxycycline.  Suspect pneumonia versus bronchitis.  Prednisone twice daily for 5 days.  Continue Mucinex.  Promethazine DM for nighttime cough and benzonatate for daytime cough.  Continue using albuterol inhaler.  Flonase and Zyrtec for her sinus congestion.  Follow-up dependent upon results of chest x-ray.  Recommend patient go to ER if she continues with shortness a breath or worsening shortness a breath.

## 2024-09-27 NOTE — PLAN OF CARE
"[facility]   Physical Therapy Initial Evaluation      Name: Gabriel Dong [facility]  Clinic Number: [id number]    Therapy Diagnosis: No diagnosis found.     Physician: Rachid Duarte,*    Physician Orders: PT Eval and Treat   Medical Diagnosis from Referral:   M17.10 (ICD-10-CM) - Arthritis of knee   M22.40 (ICD-10-CM) - Chondromalacia of patella, unspecified laterality     Evaluation Date: 9/27/2024  Authorization Period Expiration: 12/31/2024  Plan of Care Expiration: 11/22/2024  Progress Note Due: 10/18/2024  Date of Surgery: N/A  Visit # / Visits authorized: 1/ 1   FOTO: 1/5    Precautions: Standard     Time In: 7:06  Time Out: 8:04  Total Billable Time: 58 minutes    Subjective     Date of onset: 10 years ago    History of current condition - Gabriel reports: she started having bilateral knee pain in the  over 10 years ago. She reports no specific incident that occurred and symptoms came on gradually. Currently her sympotms are more intense on her left knee than her right. She feels clicking, popping, and a sensation that "her knee cap isn't tracking right". She does get tinglinging into her feet and reports back pain as well. She is a  and needs to be able to run on a moments notice.     Falls: None.    Imaging: MRI studies, bone scan films: L knee  Impression:     Negative knee radiographs.    Impression:     1. There is trace fluid superficial to the patellar tendon likely reflecting mild peritendinitis.  2. Mild sprain of the MCL.  3. Focal near full thickness fissure of the medial patellar facet articular cartilage with adjacent subchondral cystic degeneration of the patella.    Prior Therapy: None  Social History:  lives with their family  Occupation:   Prior Level of Function: Independent  Current Level of Function: Independent    Pain:  Current 6/10, worst 10/10, best 4/10   Location: bilateral knee    Description: Aching, " Grabbing, Tight, and Sharp  Aggravating Factors: Sitting, Standing, Bending, Walking, and Lifting  Easing Factors: heating pad, rest, and elevation    Patients goals: Patient would like to be able to perform her jobs responsibilities safely      Medical History:   Past Medical History:   Diagnosis Date    Abnormal Pap smear of cervix 2006    Asthma     Breast disorder        Surgical History:   Gabriel Salvador  has a past surgical history that includes Colposcopy; laproscopy ; and Breast biopsy (Left, 2011).    Medications:   Gabriel has a current medication list which includes the following prescription(s): albuterol, b.coagul,subtilis/inulin/vit c, betamethasone dipropionate, duloxetine, ibuprofen, loratadine, melatonin, meloxicam, methocarbamol, minoxidil, and multivitamin.    Allergies:   Review of patient's allergies indicates:   Allergen Reactions    Pollen extracts Other (See Comments)        Objective      Gait: lateral trunk lean to right.      Functional tests:   Squat: bilateral foot pronation, bilateral knee valgus, decreased hip flexion, left knee clicking in pain  SLS EO: 30s bilateral. Increased sway on left leg    Range of Motion (Passive):   Knee Right  Left    Flexion 140 140   Extension 5 5     Range of Motion (Active):   Knee Right  Left    Flexion 135 135   Extension 3 3       Lower Extremity Strength  Right LE  Left LE    Quadriceps: 5/5 Quadriceps: 5/5   Hamstrings: 4-/5 Hamstrings: 4-/5   Hip extension:  4/5 Hip extension: 4/5   PGM: 3-/5 PGM:  3-/5   Hip ER:  4/5 Hip ER:  4/5   Hip IR: 4-/5 Hip IR: 4-/5     Special Tests:   Right Left   Valgus Stress Test - -   Varus Stress test - -   Lachman's test - -   Posterior Lachman - -   Elen's Test + +   Patellar Grind Test + +      Palpation: TTP to bilateral paterall tendons, bilateral medial joint line, bilateral patella, right MCL      Intake Outcome Measure for FOTO Knee Survey    Therapist reviewed FOTO scores for  Gabriel Letitia Salvador on 9/27/2024.   FOTO report - see Media section or FOTO account episode details.    Intake Score: 46%         Treatment     Total Treatment time (time-based codes) separate from Evaluation: 10 minutes     Gabriel received the treatments listed below:      therapeutic activities to improve functional performance for 10  minutes, including:  Side plank GTB 1x10 bilateral  SLR 1x15 bilateral  Bridges 1x10 GTB  Sciatic nerve glide 1x20 bilateral    Patient Education and Home Exercises     Education provided:   - Findings; prognosis and plan of care  - Home exercise program  - Modality options  - Therapist contact information      Written Home Exercises Provided: yes.  Exercises were reviewed and Gabriel was able to demonstrate them prior to the end of the session.  Gabriel demonstrated good understanding of the education provided. See EMR under Patient Instructions for exercises provided during therapy sessions.    Assessment     Gabriel is a 39 y.o. female referred to outpatient Physical Therapy with a medical diagnosis of M17.10 (ICD-10-CM) - Arthritis of knee. Patient presents with bilateral hip weakness, bilateral knee and lumbar pain, and tenderness. Her objective measures suggest meniscal and retropatellar pathology. She would benefit from skilled therapy to improve aforementioned deficits in order to perform work responsibilities and physical activity.     Patient prognosis is Good.   Patient will benefit from skilled outpatient Physical Therapy to address the deficits stated above and in the chart below, provide patient /family education, and to maximize patientt's level of independence.     Plan of care discussed with patient: yes  Patient's spiritual, cultural and educational needs considered and patient is agreeable to the plan of care and goals as stated below:     Anticipated Barriers for therapy: None    Medical Necessity is demonstrated by the  following  History  Co-morbidities and personal factors that may impact the plan of care [x] LOW: no personal factors / co-morbidities  [] MODERATE: 1-2 personal factors / co-morbidities  [] HIGH: 3+ personal factors / co-morbidities    Moderate / High Support Documentation:   Co-morbidities affecting plan of care: N/A    Personal Factors:   no deficits     Examination  Body Structures and Functions, activity limitations and participation restrictions that may impact the plan of care [x] LOW: addressing 1-2 elements  [] MODERATE: 3+ elements  [] HIGH: 4+ elements (please support below)    Moderate / High Support Documentation: N/A     Clinical Presentation [x] LOW: stable  [] MODERATE: Evolving  [] HIGH: Unstable     Decision Making/ Complexity Score: low       Goals:  GOALS: Short Term Goals:  4 weeks  1.Report decreased knee pain  < / =  4/10 with activity to increase tolerance for work  2. Increase strength by 1/3 MMT grade in bilateral hips  to increase tolerance for ADL and work activities.  3. Pt to tolerate HEP to improve ROM and independence with ADL's    Long Term Goals: 8 weeks  1.Report decreased knee pain < / = 2/10 with activity to increase tolerance for work.  2.Increase strength to >/= 4+/5 in bilateral hips  to increase tolerance for ADL and work activities.  3. Pt will report at CJ level (20-40% impaired) on FOTO knee to demonstrate increase in LE function with every day tasks.   Plan     Plan of care Certification: 9/27/2024 to 11/22/2024.    Outpatient Physical Therapy 2 times weekly for 8 weeks to include the following interventions: Manual Therapy, Neuromuscular Re-ed, Patient Education, Self Care, Therapeutic Activities, and Therapeutic Exercise.     Butch Underwood, PT        Physician's Signature: _________________________________________ Date: ________________

## 2024-09-30 ENCOUNTER — CLINICAL SUPPORT (OUTPATIENT)
Dept: REHABILITATION | Facility: HOSPITAL | Age: 39
End: 2024-09-30
Payer: OTHER GOVERNMENT

## 2024-09-30 ENCOUNTER — HOSPITAL ENCOUNTER (OUTPATIENT)
Dept: RADIOLOGY | Facility: HOSPITAL | Age: 39
Discharge: HOME | End: 2024-09-30
Attending: STUDENT IN AN ORGANIZED HEALTH CARE EDUCATION/TRAINING PROGRAM
Payer: COMMERCIAL

## 2024-09-30 DIAGNOSIS — M22.40 CHONDROMALACIA OF PATELLA, UNSPECIFIED LATERALITY: ICD-10-CM

## 2024-09-30 DIAGNOSIS — R92.8 ABNORMAL MAMMOGRAM: ICD-10-CM

## 2024-09-30 DIAGNOSIS — R29.898 DECREASED STRENGTH OF LOWER EXTREMITY: Primary | ICD-10-CM

## 2024-09-30 DIAGNOSIS — M17.10 ARTHRITIS OF KNEE: ICD-10-CM

## 2024-09-30 PROCEDURE — 77061 BREAST TOMOSYNTHESIS UNI: CPT | Mod: 26,LT,, | Performed by: RADIOLOGY

## 2024-09-30 PROCEDURE — 77061 BREAST TOMOSYNTHESIS UNI: CPT | Mod: TC,LT

## 2024-09-30 PROCEDURE — 97112 NEUROMUSCULAR REEDUCATION: CPT | Mod: PN

## 2024-09-30 PROCEDURE — 76642 ULTRASOUND BREAST LIMITED: CPT | Mod: TC,LT

## 2024-09-30 PROCEDURE — 97110 THERAPEUTIC EXERCISES: CPT | Mod: PN

## 2024-09-30 PROCEDURE — 77065 DX MAMMO INCL CAD UNI: CPT | Mod: 26,LT,, | Performed by: RADIOLOGY

## 2024-09-30 PROCEDURE — 76642 ULTRASOUND BREAST LIMITED: CPT | Mod: 26,LT,, | Performed by: RADIOLOGY

## 2024-09-30 PROCEDURE — 97530 THERAPEUTIC ACTIVITIES: CPT | Mod: PN

## 2024-09-30 PROCEDURE — 77065 DX MAMMO INCL CAD UNI: CPT | Mod: TC,LT

## 2024-09-30 NOTE — PROGRESS NOTES
Physical Therapy Treatment Note     Name: Gabriel Dong [facility] Number: [id number]    Therapy Diagnosis:   Encounter Diagnoses   Name Primary?    Arthritis of knee     Chondromalacia of patella, unspecified laterality     Decreased strength of lower extremity Yes     Physician: Rachid Duarte,*    Visit Date: 9/30/2024    Evaluation Date: 9/27/2024  Authorization Period Expiration: 12/31/2024  Plan of Care Expiration: 11/22/2024  Progress Note Due: 10/18/2024  Date of Surgery: N/A  Visit # / Visits authorized: 1/ 20  FOTO: 1/5     Precautions: Standard      Time In: 800 am  Time Out: 853 am  Total Billable Time: 53 minutes    Subjective     Pt reports: she is doing okay. Knee pain comes and goes  She was compliant with home exercise program.  Response to previous treatment: felt fine  Functional change: ongoing    Pain: 4/10  Location: bilateral knee      Objective     Gabriel received therapeutic exercises to develop endurance and ROM for 14 minutes including:    Upright Bike 8 minutes; Level 2   Education - HEP     Gabrile participated in neuromuscular re-education activities to improve: Coordination, Kinesthetic, and Sense for 28 minutes. The following activities were included:    SLR 2 x 10 2# B  SL Hip Abd 2# 3 x 8 B   Standing Hip Ext 3 x 8 B; leaning over wedge GTB  DL Bridge 3 x 8   Seated LAQ 3 x 10 B 10#    therapeutic activities to improve functional performance for 11 minutes, including:    DL Leg Press 3 x 10 50#  SL Leg Press 3 x 10 25#    Home Exercises Provided and Patient Education Provided     Education provided:   - HEP    Written Home Exercises Provided: yes.  Exercises were reviewed and Gabriel was able to demonstrate them prior to the end of the session.  Gabriel demonstrated good  understanding of the education provided.     See EMR under Patient Instructions for exercises provided 9/30/2024.    Assessment     Gabriel did well today. Reported wearing mask due  to pneumonia. Rest breaks require due to coughing. Advised her limit activity if she becomes SOB and call her doctor if symptoms worsen. Will continue to progress as tolerated.     Gabriel Is progressing well towards her goals.   Pt prognosis is Fair.     Pt will continue to benefit from skilled outpatient physical therapy to address the deficits listed in the problem list box on initial evaluation, provide pt/family education and to maximize pt's level of independence in the home and community environment.     Pt's spiritual, cultural and educational needs considered and pt agreeable to plan of care and goals.     Anticipated barriers to physical therapy: chronicity    Goals:  GOALS: Short Term Goals:  4 weeks  1.Report decreased knee pain  < / =  4/10 with activity to increase tolerance for work  2. Increase strength by 1/3 MMT grade in bilateral hips  to increase tolerance for ADL and work activities.  3. Pt to tolerate HEP to improve ROM and independence with ADL's     Long Term Goals: 8 weeks  1.Report decreased knee pain < / = 2/10 with activity to increase tolerance for work.  2.Increase strength to >/= 4+/5 in bilateral hips  to increase tolerance for ADL and work activities.  3. Pt will report at CJ level (20-40% impaired) on FOTO knee to demonstrate increase in LE function with every day tasks.   Plan      Plan of care Certification: 9/27/2024 to 11/22/2024.    Khoi Holland, PT , DPT, OCS

## 2024-10-01 DIAGNOSIS — R92.8 FOLLOW-UP EXAMINATION OF ABNORMAL MAMMOGRAM: Primary | ICD-10-CM

## 2024-10-01 DIAGNOSIS — R92.8 ABNORMAL MAMMOGRAM: Primary | ICD-10-CM

## 2024-10-02 ENCOUNTER — TELEPHONE (OUTPATIENT)
Dept: FAMILY MEDICINE | Facility: CLINIC | Age: 39
End: 2024-10-02
Payer: COMMERCIAL

## 2024-10-02 ENCOUNTER — CLINICAL SUPPORT (OUTPATIENT)
Dept: REHABILITATION | Facility: HOSPITAL | Age: 39
End: 2024-10-02
Payer: OTHER GOVERNMENT

## 2024-10-02 DIAGNOSIS — R29.898 DECREASED STRENGTH OF LOWER EXTREMITY: Primary | ICD-10-CM

## 2024-10-02 PROCEDURE — 97530 THERAPEUTIC ACTIVITIES: CPT | Mod: PN,CQ

## 2024-10-02 PROCEDURE — 97112 NEUROMUSCULAR REEDUCATION: CPT | Mod: PN,CQ

## 2024-10-02 NOTE — PROGRESS NOTES
Physical Therapy Treatment Note     Name: Gabriel Dong [facility] Number: [id number]    Therapy Diagnosis:   Encounter Diagnosis   Name Primary?    Decreased strength of lower extremity Yes     Physician: Rachid Duarte,*    Visit Date: 10/2/2024    Evaluation Date: 9/27/2024  Authorization Period Expiration: 12/31/2024  Plan of Care Expiration: 11/22/2024  Progress Note Due: 10/18/2024  Date of Surgery: N/A  Visit # / Visits authorized: 3/ 20 (Plan of Care 2/20)  FOTO: 1/5     Precautions: Standard      Time In: 0700  Time Out: 0756  Total Billable Time: 56 minutes    Subjective     Pt reports: she felt a popping in her knee this morning but no other pain.  Pt stated she has been sick with pneumonia for about a week.    She was compliant with home exercise program.  Response to previous treatment: felt fine  Functional change: ongoing    Pain: 0/10  Location: bilateral knee      Objective     Pt wearing a mask.      Gabriel received therapeutic exercises to develop endurance and ROM for  minutes including:    Upright Bike 8 minutes; Level 2 (held today due to pt noted be slight shortness of breath)      Gabriel participated in neuromuscular re-education activities to improve: Coordination, Kinesthetic, and Sense for 46 minutes. The following activities were included:    Straight leg raise 2 x 10 reps 2# bilateral   Sidelying Hip Abduction 2# 2 x 10 bilateral    Short arc quad 2 x 10 reps 5# bilateral   Standing Hip extension 3 x 10 reps bilateral; leaning over wedge Green Theraband   Double Leg Bridge 3 x 8 reps   Seated Long arc quad 3 x 10 reps bilateral 10#    therapeutic activities to improve functional performance for 10 minutes, including:    Precor Double Leg Press 3 x 10 reps 50#  Precor Single Leg Press 3 x 10 reps 25#    Home Exercises Provided and Patient Education Provided     Education provided:   - Home exercise program     Written Home Exercises Provided: yes.  Exercises  "were reviewed and Gabriel was able to demonstrate them prior to the end of the session.  Gabriel demonstrated good  understanding of the education provided.     See EMR under Patient Instructions for exercises provided 9/30/2024.    Assessment     Gabriel arrived to PT today with no pain.  She does report a "popping" in her Left knee this morning but it does not appear to be constant.  Pt also noted to have residual effects from pneumonia (ie coughing, shortness of breath), which increased with exercises.    Held bike today and limited exercises to mat and a few in standing.      Gabriel Is progressing well towards her goals.   Pt prognosis is Fair.     Pt will continue to benefit from skilled outpatient physical therapy to address the deficits listed in the problem list box on initial evaluation, provide pt/family education and to maximize pt's level of independence in the home and community environment.     Pt's spiritual, cultural and educational needs considered and pt agreeable to plan of care and goals.     Anticipated barriers to physical therapy: chronicity    Goals:  GOALS: Short Term Goals:  4 weeks  1.Report decreased knee pain  < / =  4/10 with activity to increase tolerance for work  2. Increase strength by 1/3 MMT grade in bilateral hips  to increase tolerance for ADL and work activities.  3. Pt to tolerate HEP to improve ROM and independence with ADL's     Long Term Goals: 8 weeks  1.Report decreased knee pain < / = 2/10 with activity to increase tolerance for work.  2.Increase strength to >/= 4+/5 in bilateral hips  to increase tolerance for ADL and work activities.  3. Pt will report at CJ level (20-40% impaired) on FOTO knee to demonstrate increase in LE function with every day tasks.   Plan      Plan of care Certification: 9/27/2024 to 11/22/2024.    Cris Jackson, WENDY        "

## 2024-10-02 NOTE — TELEPHONE ENCOUNTER
----- Message from Bonnie Murillo DO sent at 10/1/2024  5:20 PM CDT -----  Please inform patient that her mammogram results were reviewed and she will need to also complete repeat left breast diagnostic mammogram in 6 mos. For surveillance.  We will assist you with scheduling this exam if you have not already schedule this test.    Dr. Murillo

## 2024-10-06 ENCOUNTER — PATIENT MESSAGE (OUTPATIENT)
Dept: FAMILY MEDICINE | Facility: CLINIC | Age: 39
End: 2024-10-06
Payer: COMMERCIAL

## 2024-10-07 ENCOUNTER — PATIENT MESSAGE (OUTPATIENT)
Dept: PSYCHIATRY | Facility: CLINIC | Age: 39
End: 2024-10-07
Payer: COMMERCIAL

## 2024-10-07 ENCOUNTER — PATIENT MESSAGE (OUTPATIENT)
Dept: FAMILY MEDICINE | Facility: CLINIC | Age: 39
End: 2024-10-07
Payer: COMMERCIAL

## 2024-10-07 DIAGNOSIS — M54.42 CHRONIC BILATERAL LOW BACK PAIN WITH BILATERAL SCIATICA: Primary | ICD-10-CM

## 2024-10-07 DIAGNOSIS — G89.29 CHRONIC BILATERAL LOW BACK PAIN WITH BILATERAL SCIATICA: Primary | ICD-10-CM

## 2024-10-07 DIAGNOSIS — M54.41 CHRONIC BILATERAL LOW BACK PAIN WITH BILATERAL SCIATICA: Primary | ICD-10-CM

## 2024-10-07 NOTE — TELEPHONE ENCOUNTER
Response:    I can understand that back pain is certainly not comfortable.  I will place a referral to our back and spine Clinic for further evaluation of this concern.  I think this will be the best specialty to further assess the pain treat the symptoms.  Unfortunately I do not have sooner availability.  You are also welcome to schedule an appointment with one of our advanced practice providers.     Dr. Murillo                          :

## 2024-10-08 DIAGNOSIS — F41.9 ANXIETY: Primary | ICD-10-CM

## 2024-10-08 NOTE — TELEPHONE ENCOUNTER
Response:    I will sign the referral for the [facility].  I am not certain if we were able to discuss the list of community resources provided but we also have a list of Community health resources for Behavioral Health, outside of [facility] which may be able to accommodate a sooner visit.   If you would like a referral to any of these organizations, please inform us of which provider you would like to see.

## 2024-10-11 ENCOUNTER — HOSPITAL ENCOUNTER (OUTPATIENT)
Dept: RADIOLOGY | Facility: HOSPITAL | Age: 39
Discharge: HOME | End: 2024-10-11
Attending: STUDENT IN AN ORGANIZED HEALTH CARE EDUCATION/TRAINING PROGRAM
Payer: COMMERCIAL

## 2024-10-11 ENCOUNTER — HOSPITAL ENCOUNTER (OUTPATIENT)
Dept: RADIOLOGY | Facility: HOSPITAL | Age: 39
Discharge: HOME | End: 2024-10-11
Payer: COMMERCIAL

## 2024-10-11 ENCOUNTER — PATIENT MESSAGE (OUTPATIENT)
Dept: FAMILY MEDICINE | Facility: CLINIC | Age: 39
End: 2024-10-11

## 2024-10-11 ENCOUNTER — OFFICE VISIT (OUTPATIENT)
Dept: FAMILY MEDICINE | Facility: CLINIC | Age: 39
End: 2024-10-11
Payer: COMMERCIAL

## 2024-10-11 VITALS
HEART RATE: 62 BPM | DIASTOLIC BLOOD PRESSURE: 72 MMHG | SYSTOLIC BLOOD PRESSURE: 126 MMHG | TEMPERATURE: 98 F | RESPIRATION RATE: 18 BRPM | WEIGHT: 189.69 LBS | OXYGEN SATURATION: 98 % | HEIGHT: 64 IN | BODY MASS INDEX: 32.38 KG/M2

## 2024-10-11 DIAGNOSIS — M25.561 CHRONIC PAIN OF RIGHT KNEE: ICD-10-CM

## 2024-10-11 DIAGNOSIS — G89.29 CHRONIC PAIN OF RIGHT KNEE: ICD-10-CM

## 2024-10-11 DIAGNOSIS — J18.9 PNEUMONIA OF RIGHT UPPER LOBE DUE TO INFECTIOUS ORGANISM: ICD-10-CM

## 2024-10-11 DIAGNOSIS — J18.9 PNEUMONIA OF RIGHT UPPER LOBE DUE TO INFECTIOUS ORGANISM: Primary | ICD-10-CM

## 2024-10-11 PROCEDURE — 71046 X-RAY EXAM CHEST 2 VIEWS: CPT | Mod: TC

## 2024-10-11 PROCEDURE — 73721 MRI JNT OF LWR EXTRE W/O DYE: CPT | Mod: TC,RT

## 2024-10-11 PROCEDURE — 71046 X-RAY EXAM CHEST 2 VIEWS: CPT | Mod: 26,,, | Performed by: RADIOLOGY

## 2024-10-11 PROCEDURE — 73721 MRI JNT OF LWR EXTRE W/O DYE: CPT | Mod: 26,RT,, | Performed by: RADIOLOGY

## 2024-10-11 PROCEDURE — 99999 PR PBB SHADOW E&M-EST. PATIENT-LVL V: CPT | Mod: PBBFAC,,,

## 2024-10-11 RX ORDER — DOXYCYCLINE 100 MG/1
100 CAPSULE ORAL EVERY 12 HOURS
Qty: 14 CAPSULE | Refills: 0 | Status: SHIPPED | OUTPATIENT
Start: 2024-10-11 | End: 2024-10-18

## 2024-10-11 NOTE — PROGRESS NOTES
"Subjective:       Patient ID: Gabriel Salvador is a 39 y.o. female.    Chief Complaint: Follow-up (2 week)    Gabriel Salvador is a 39-year-old female patient who presents to clinic for follow up of pneumonia.  She states she is getting better slowly but is still coughing.  The cough is keeping her up at night. She has finished her antibiotics.  Still using Claritin, flonase and promethazine DM which do help but cough returns as soon as medication is worn off. No fever or chills.  Cough is no longer productive.  Shortness of breath has improved. No fever or chills.  Chest xray today shows "Resolving right upper lobe pneumonia" with recommendation for follow up xray in 6-8 weeks.         Narrative & Impression  CLINICAL HISTORY:  ([id number])38 y/o  (1985) F     Pneumonia, unspecified organism     TECHNIQUE:  (A#[id number], exam time 10/11/2024 10:17)     XR CHEST PA AND LATERAL [id number]     COMPARISON:  Radiograph from 09/27/2024.     FINDINGS:  There is only a very faint, small interstitial opacity in the right upper lobe 100 clavicle, decrease in size/conspicuity from the previous exam.  Costophrenic angles are seen without effusion. No pneumothorax is identified. The heart is normal in size. The mediastinum is within normal limits. Osseous structures appear within normal limits. The visualized upper abdomen is unremarkable.     Impression:     Resolving right upper lobe pneumonia.  Consider radiographic follow-up in 6-8 weeks after treatment to document resolution (as radiographic findings may persist beyond clinical symptoms and underlying malignancy is not excluded).        Electronically signed by:Wyatt Beyer  Date:                                            10/11/2024  Time:                                           10:24        Review of patient's allergies indicates:   Allergen Reactions    Pollen extracts Other (See Comments)     Social Drivers of Health     Tobacco Use: " Low Risk  (10/11/2024)    Patient History     Smoking Tobacco Use: Never     Smokeless Tobacco Use: Never     Passive Exposure: Not on file   Alcohol Use: Not At Risk (8/19/2024)    AUDIT-C     Frequency of Alcohol Consumption: Monthly or less     Average Number of Drinks: 1 or 2     Frequency of Binge Drinking: Never   Financial Resource Strain: Low Risk  (8/19/2024)    Overall Financial Resource Strain (CARDIA)     Difficulty of Paying Living Expenses: Not hard at all   Food Insecurity: No Food Insecurity (8/19/2024)    Hunger Vital Sign     Worried About Running Out of Food in the Last Year: Never true     Ran Out of Food in the Last Year: Never true   Transportation Needs: Not on file   Physical Activity: Unknown (8/19/2024)    Exercise Vital Sign     Days of Exercise per Week: 5 days     Minutes of Exercise per Session: Not on file   Stress: Stress Concern Present (8/19/2024)    [employer] - Occupational Stress Questionnaire     Feeling of Stress : Very much   Housing Stability: Unknown (8/19/2024)    Housing Stability Vital Sign     Unable to Pay for Housing in the Last Year: No     Homeless in the Last Year: Not on file   Depression: Low Risk  (10/11/2024)    Depression     Last PHQ-4: Flowsheet Data: 0   Recent Concern: Depression - High Risk (8/19/2024)    Depression     Last PHQ-4: Flowsheet Data: 14   Utilities: Not At Risk (8/19/2024)    [facility] Utilities     Threatened with loss of utilities: No   Health Literacy: Adequate Health Literacy (8/19/2024)     Health Literacy     Frequency of need for help with medical instructions: Never   Social Isolation: Not on file      Past Medical History:   Diagnosis Date    Abnormal Pap smear of cervix 2006    Asthma     Breast disorder       Past Surgical History:   Procedure Laterality Date    BREAST BIOPSY Left 2011    COLPOSCOPY      laproscopy         Social History     Socioeconomic History    Marital status:           Current Outpatient Medications:     albuterol (PROVENTIL/VENTOLIN HFA) 90 mcg/actuation inhaler, Inhale 2 puffs into the lungs every 6 (six) hours as needed for Wheezing., Disp: 51 each, Rfl: 2    B.coagul,subtilis/inulin/vit C (CULTURELLE PROBIOTIC-PREBIOTIC ORAL), , Disp: , Rfl:     cetirizine (ZYRTEC) 10 MG tablet, Take 1 tablet (10 mg total) by mouth once daily., Disp: 30 tablet, Rfl: 0    fluticasone propionate (FLONASE) 50 mcg/actuation nasal spray, 1 spray (50 mcg total) by Each Nostril route once daily., Disp: 16 g, Rfl: 0    ibuprofen (ADVIL,MOTRIN) 800 MG tablet, Take 1 tablet (800 mg total) by mouth every 6 (six) hours as needed for Pain or Temperature greater than., Disp: 90 tablet, Rfl: 0    loratadine 10 mg Cap, , Disp: , Rfl:     multivitamin capsule, Take 1 capsule by mouth once daily., Disp: , Rfl:     betamethasone dipropionate 0.05 % cream, Apply topically 2 (two) times daily. Use with Minoxidil (Patient not taking: Reported on 10/11/2024), Disp: 45 g, Rfl: 4    doxycycline (VIBRAMYCIN) 100 MG Cap, Take 1 capsule (100 mg total) by mouth every 12 (twelve) hours. for 7 days, Disp: 14 capsule, Rfl: 0    DULoxetine (CYMBALTA) 60 MG capsule, Take 1 capsule (60 mg total) by mouth once daily. (Patient not taking: Reported on 10/11/2024), Disp: 30 capsule, Rfl: 1    MELATONIN ORAL, Take by mouth. (Patient not taking: Reported on 10/11/2024), Disp: , Rfl:     meloxicam (MOBIC) 15 MG tablet, Take 1 tablet (15 mg total) by mouth once daily. For back pain/Knee pain, take with food. Do not take with Ibuprofen/Motrin/Aleve (Patient not taking: Reported on 10/11/2024), Disp: 30 tablet, Rfl: 5    minoxidiL (ROGAINE) 2 % external solution, Apply topically 2 (two) times daily. For hair loss (Patient not taking: Reported on 10/11/2024), Disp: 60 mL, Rfl: 4    Lab Results   Component Value Date    WBC 4.22 08/30/2024    HGB 13.1 08/30/2024    HCT 41.7 08/30/2024     08/30/2024    CHOL 159 08/30/2024     TRIG 52 08/30/2024    HDL 51 08/30/2024    ALT 10 08/30/2024    AST 20 08/30/2024     08/30/2024    K 4.2 08/30/2024     08/30/2024    CREATININE 1.0 08/30/2024    BUN 13 08/30/2024    CO2 26 08/30/2024    TSH 0.878 08/30/2024    HGBA1C 5.2 08/30/2024       Review of Systems   Constitutional: Negative.  Negative for chills and fever.   HENT: Negative.     Respiratory:  Positive for cough.    Cardiovascular:  Negative for chest pain and palpitations.       Objective:      Physical Exam  Vitals reviewed.   Constitutional:       Appearance: Normal appearance.   HENT:      Right Ear: Tympanic membrane normal. There is impacted cerumen.      Left Ear: Tympanic membrane normal.      Ears:      Comments: Right cerumen impaction removed via lavage, patient tolerated well.      Nose: Nose normal.      Mouth/Throat:      Pharynx: Oropharynx is clear. Posterior oropharyngeal erythema present.   Eyes:      Conjunctiva/sclera: Conjunctivae normal.   Cardiovascular:      Rate and Rhythm: Normal rate and regular rhythm.      Pulses: Normal pulses.      Heart sounds: Normal heart sounds.   Pulmonary:      Effort: Pulmonary effort is normal.      Breath sounds: Normal breath sounds. No decreased breath sounds, wheezing or rhonchi.      Comments: Dry cough present, not as frequent.   Skin:     General: Skin is warm and dry.      Capillary Refill: Capillary refill takes less than 2 seconds.   Neurological:      General: No focal deficit present.      Mental Status: She is alert.   Psychiatric:         Mood and Affect: Mood normal.         Thought Content: Thought content normal.         Judgment: Judgment normal.         Assessment:       1. Pneumonia of right upper lobe due to infectious organism        Plan:       Gabriel was seen today for follow-up.    Diagnoses and all orders for this visit:    Pneumonia of right upper lobe due to infectious organism  -     doxycycline (VIBRAMYCIN) 100 MG Cap; Take 1 capsule (100 mg  total) by mouth every 12 (twelve) hours. for 7 days  -     X-Ray Chest PA And Lateral; Future    Repeat chest xray in 6-8 weeks  Will extend doxycycline for another 7 days    Continue cough suppressants and mucinex.    Follow up if symptoms worsen or do not improve.

## 2024-10-11 NOTE — LETTER
October 11, 2024      Dr. Sanabria - [facility]  [address]  [address]  [place] [address]  Phone: [phone]  Fax: [phone]       Patient: Gabriel Salvador   YOB: 1985  Date of Visit: 10/11/2024    To Whom It May Concern:    Cyndi Salvador  was at [facility] on 10/11/2024. The patient may return to work on 10/14/2024 with no restrictions. If you have any questions or concerns, or if I can be of further assistance, please do not hesitate to contact me.    Sincerely,       YOVANNY Schaeffer

## 2024-10-14 ENCOUNTER — PATIENT MESSAGE (OUTPATIENT)
Dept: SPINE | Facility: CLINIC | Age: 39
End: 2024-10-14

## 2024-10-14 ENCOUNTER — HOSPITAL ENCOUNTER (OUTPATIENT)
Dept: RADIOLOGY | Facility: HOSPITAL | Age: 39
Discharge: HOME | End: 2024-10-14
Attending: PHYSICAL MEDICINE & REHABILITATION
Payer: COMMERCIAL

## 2024-10-14 ENCOUNTER — CLINICAL SUPPORT (OUTPATIENT)
Dept: REHABILITATION | Facility: HOSPITAL | Age: 39
End: 2024-10-14
Payer: COMMERCIAL

## 2024-10-14 ENCOUNTER — TELEPHONE (OUTPATIENT)
Dept: FAMILY MEDICINE | Facility: CLINIC | Age: 39
End: 2024-10-14
Payer: COMMERCIAL

## 2024-10-14 ENCOUNTER — OFFICE VISIT (OUTPATIENT)
Dept: SPINE | Facility: CLINIC | Age: 39
End: 2024-10-14
Payer: COMMERCIAL

## 2024-10-14 VITALS — HEIGHT: 64 IN | WEIGHT: 189.63 LBS | BODY MASS INDEX: 32.37 KG/M2

## 2024-10-14 DIAGNOSIS — G89.29 CHRONIC BILATERAL LOW BACK PAIN WITH BILATERAL SCIATICA: ICD-10-CM

## 2024-10-14 DIAGNOSIS — M54.42 CHRONIC BILATERAL LOW BACK PAIN WITH BILATERAL SCIATICA: ICD-10-CM

## 2024-10-14 DIAGNOSIS — R29.898 DECREASED STRENGTH OF LOWER EXTREMITY: Primary | ICD-10-CM

## 2024-10-14 DIAGNOSIS — M54.41 CHRONIC BILATERAL LOW BACK PAIN WITH BILATERAL SCIATICA: ICD-10-CM

## 2024-10-14 PROCEDURE — 72114 X-RAY EXAM L-S SPINE BENDING: CPT | Mod: 26,,, | Performed by: RADIOLOGY

## 2024-10-14 PROCEDURE — 99204 OFFICE O/P NEW MOD 45 MIN: CPT | Mod: S$GLB,,, | Performed by: PHYSICAL MEDICINE & REHABILITATION

## 2024-10-14 PROCEDURE — 97112 NEUROMUSCULAR REEDUCATION: CPT | Mod: PN

## 2024-10-14 PROCEDURE — 97530 THERAPEUTIC ACTIVITIES: CPT | Mod: PN

## 2024-10-14 PROCEDURE — 97110 THERAPEUTIC EXERCISES: CPT | Mod: PN

## 2024-10-14 PROCEDURE — 72114 X-RAY EXAM L-S SPINE BENDING: CPT | Mod: TC

## 2024-10-14 NOTE — PROGRESS NOTES
Physical Therapy Treatment Note     Name: Gabriel Dong [facility] Number: [id number]    Therapy Diagnosis:   Encounter Diagnosis   Name Primary?    Decreased strength of lower extremity Yes       Physician: Rachid Duarte,*    Visit Date: 10/14/2024    Evaluation Date: 9/27/2024  Authorization Period Expiration: 12/31/2024  Plan of Care Expiration: 11/22/2024  Progress Note Due: 10/18/2024  Date of Surgery: N/A  Visit # / Visits authorized: 4/ 20 (Plan of Care 2/20)  FOTO: 1/5     Precautions: Standard      Time In: 700 am  Time Out: 755 am  Total Billable Time: 55 minutes    Subjective     Pt reports: she got an MRI on her Right knee as well which showed degenerative changes. Pt has not spoke with doctor about this yet.     She was compliant with home exercise program.  Response to previous treatment: felt fine  Functional change: ongoing    Pain: 0/10  Location: bilateral knee      Objective     Pt wearing a mask.      Gabriel received therapeutic exercises to develop endurance and ROM for 8 minutes including:    Education - POC / HEP     Not today:   Upright Bike 8 minutes; Level 2 (held today due to pt noted be slight shortness of breath)    Gabriel participated in neuromuscular re-education activities to improve: Coordination, Kinesthetic, and Sense for 35 minutes. The following activities were included:    Straight leg raise 2 x 10 reps 3# bilateral   Sidelying Hip Abduction 3# 3 x 10 bilateral      Standing Hip extension on shuttle; 3 x 10  Double Leg Bridge 3 x 8 reps     Precor Leg Ext 3 x 10 15# SL  Precor Leg Curl 3 x 10 40# DL    therapeutic activities to improve functional performance for 12 minutes, including:    Precor Double Leg Press 3 x 10 reps 87#  Precor Single Leg Press 3 x 10 reps 50#    Home Exercises Provided and Patient Education Provided     Education provided:   - Home exercise program     Written Home Exercises Provided: yes.  Exercises were reviewed and  Gabriel was able to demonstrate them prior to the end of the session.  Gabriel demonstrated good  understanding of the education provided.     See EMR under Patient Instructions for exercises provided 9/30/2024.    Assessment     Gabriel did well today. Expressed some concern about R knee MRI results. Advised her to speak with MD about this (Dr. Duarte) if she is looking for an ortho considering he sent her for L knee pain. I informed her that I cannot review Mri results as a PT. Pt verbalized understanding. I do not think she need another referral for PT since this POC is working on both knees. She verbalized understanding that long term she must improve strength and motor control to support her knee joints. If we fail to make progress, she will have to go back to MD for further work up.     Gabriel Is progressing well towards her goals.   Pt prognosis is Fair.     Pt will continue to benefit from skilled outpatient physical therapy to address the deficits listed in the problem list box on initial evaluation, provide pt/family education and to maximize pt's level of independence in the home and community environment.     Pt's spiritual, cultural and educational needs considered and pt agreeable to plan of care and goals.     Anticipated barriers to physical therapy: chronicity    Goals:  GOALS: Short Term Goals:  4 weeks  1.Report decreased knee pain  < / =  4/10 with activity to increase tolerance for work  2. Increase strength by 1/3 MMT grade in bilateral hips  to increase tolerance for ADL and work activities.  3. Pt to tolerate HEP to improve ROM and independence with ADL's     Long Term Goals: 8 weeks  1.Report decreased knee pain < / = 2/10 with activity to increase tolerance for work.  2.Increase strength to >/= 4+/5 in bilateral hips  to increase tolerance for ADL and work activities.  3. Pt will report at CJ level (20-40% impaired) on FOTO knee to demonstrate increase in LE function with every  day tasks.   Plan      Plan of care Certification: 9/27/2024 to 11/22/2024.    Khoi Holland, PT

## 2024-10-14 NOTE — PROGRESS NOTES
SUBJECTIVE:    Patient ID: Gabriel Salvador is a 39 y.o. female.    Chief Complaint: Low-back Pain    This is a 39-year-old woman who sees Dr. Murillo for her primary care.  Denies any chronic major medical problems.  No cancer history.  She is getting over a bout of pneumonia.  Long history of low back pain at the lumbosacral junction that radiates into the posterior portion of the right leg to the knee and the posterior portion of the left leg to the lateral ankle.  Worsened with activity.  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  Never had any treatment for her back.  Denies bowel or bladder dysfunction fever chills sweats or unexpected weight loss.  She is already in physical therapy now for bilateral knee pain.  Pain level is 6/10 and at times as high as 8/10 and interferes with quality of life in terms of activities of daily living recreation and social activities.  The only imaging I have is a CT of the abdomen and pelvis done in August of 2019 which shows rather advanced degenerative disc disease at L5-S1.          Past Medical History:   Diagnosis Date    Abnormal Pap smear of cervix 2006    Asthma     Breast disorder      Social History     Socioeconomic History    Marital status:    Tobacco Use    Smoking status: Never    Smokeless tobacco: Never   Substance and Sexual Activity    Alcohol use: No    Drug use: No    Sexual activity: Yes     Partners: Male     Birth control/protection: None     Social Drivers of Health     Financial Resource Strain: Low Risk  (8/19/2024)    Overall Financial Resource Strain (CARDIA)     Difficulty of Paying Living Expenses: Not hard at all   Food Insecurity: No Food Insecurity (8/19/2024)    Hunger Vital Sign     Worried About Running Out of Food in the Last Year: Never true     Ran Out of Food in the Last Year: Never true   Physical Activity: Unknown (8/19/2024)    Exercise Vital Sign     Days of Exercise per Week: 5 days   Stress: Stress  "Concern Present (8/19/2024)    [employer] - Occupational Stress Questionnaire     Feeling of Stress : Very much   Housing Stability: Unknown (8/19/2024)    Housing Stability Vital Sign     Unable to Pay for Housing in the Last Year: No     Past Surgical History:   Procedure Laterality Date    BREAST BIOPSY Left 2011    COLPOSCOPY      laproscopy        Family History   Problem Relation Name Age of Onset    Diabetes Father       Vitals:    10/14/24 0851   Weight: 86 kg (189 lb 9.5 oz)   Height: 5' 4" (1.626 m)       Review of Systems   Constitutional:  Negative for chills, diaphoresis, fatigue, fever and unexpected weight change.   HENT:  Negative for trouble swallowing.    Eyes:  Negative for visual disturbance.   Respiratory:  Negative for shortness of breath.    Cardiovascular:  Negative for chest pain.   Gastrointestinal:  Negative for abdominal pain, constipation, nausea and vomiting.   Genitourinary:  Negative for difficulty urinating.   Musculoskeletal:  Negative for arthralgias, back pain, gait problem, joint swelling, myalgias, neck pain and neck stiffness.   Neurological:  Negative for dizziness, speech difficulty, weakness, light-headedness, numbness and headaches.          Objective:      Physical Exam  Neurological:      Mental Status: She is alert and oriented to person, place, and time.      Comments: She is awake and in no acute distress  Mild tenderness to palpation lumbar paraspinous musculature at the lumbosacral junction with no palpable masses  Forward flexion of the lumbar spine is to about 60° before complaint pain at the lumbosacral junction.  Extension at 10° causes pain at the lumbosacral junction  She can heel and toe walk normally  Reflexes- +1-+2 reflexes at the following:   C5-Biceps   C6-Brachioradialis   C7-Triceps   L3/4-Patellar   S1-Achilles   Miladis sign negative bilaterally  Strength testing- 5/5 strength in the following muscle groups:  C5-Elbow " flexion  C6-Wrist extension  C7-Elbow extension  C8-Finger flexion  T1-Finger abduction  L2-Hip flexion  L3-Knee extension  L4-Ankle dorsiflexion  L5-Great toe extension  S1-Ankle plantar flexion       Straight leg raise negative bilaterally             Assessment:       1. Chronic bilateral low back pain with bilateral sciatica           Plan:     She has a nonfocal neurological examination and no historical red flags.  I suspect she has chronic low back pain on basis of degenerative disc disease and facet arthropathy.  Has pain with facet loading.  She has symptoms of lumbar radiculitis with no evidence of nerve root dysfunction.  I think she can be treated conservatively.  I am recommending physical therapy.  We will get some x-rays.  Follow up with me at the completion of therapy or as needed.      Chronic bilateral low back pain with bilateral sciatica  -     Ambulatory referral[facility]consult to [facility]  -     X-Ray Lumbar Complete Including Flex And Ext; Future; Expected date: 10/14/2024  -     Ambulatory referral/consult to Physical/Occupational Therapy; Future; Expected date: 10/21/2024

## 2024-10-14 NOTE — TELEPHONE ENCOUNTER
----- Message from Bonnie Murillo DO sent at 10/14/2024 12:01 PM CDT -----  Please inform patient that the    MRI results of right knee have been reviewed.  There is no evidence of ligamentous tear.  You were noted to have some degenerative joint disease.  I do recommend continued follow-up with Orthopedics for further evaluation and treatment to alleviate symptoms.    Dr. Murillo

## 2024-10-21 ENCOUNTER — CLINICAL SUPPORT (OUTPATIENT)
Dept: REHABILITATION | Facility: HOSPITAL | Age: 39
End: 2024-10-21
Payer: OTHER GOVERNMENT

## 2024-10-21 DIAGNOSIS — R29.898 DECREASED STRENGTH OF LOWER EXTREMITY: Primary | ICD-10-CM

## 2024-10-21 PROCEDURE — 97112 NEUROMUSCULAR REEDUCATION: CPT | Mod: PN

## 2024-10-21 PROCEDURE — 97110 THERAPEUTIC EXERCISES: CPT | Mod: PN

## 2024-10-21 PROCEDURE — 97530 THERAPEUTIC ACTIVITIES: CPT | Mod: PN

## 2024-10-21 NOTE — PROGRESS NOTES
Physical Therapy Treatment Note     Name: Gabriel Dong [facility] Number: [id number]    Therapy Diagnosis:   Encounter Diagnosis   Name Primary?    Decreased strength of lower extremity Yes     Physician: Rachid Duarte,*    Visit Date: 10/21/2024    Evaluation Date: 9/27/2024  Authorization Period Expiration: 12/31/2024  Plan of Care Expiration: 11/22/2024  Progress Note Due: 10/18/2024  Date of Surgery: N/A  Visit # / Visits authorized: 5 / 20 (Plan of Care 2/20)  FOTO: 1/5     Precautions: Standard      Time In: 700 am  Time Out: 755 am  Total Billable Time: 55 minutes    Subjective     Pt reports: she had some hip soreness after last session. States R knee was also sore under the knee cap. States she is a little worried about knee therapy because she is only getting seen for 1 of her knees when both hurt. States she is also getting a referral for her back so she is wondering what she should do for PT for that.     She was compliant with home exercise program.  Response to previous treatment: felt fine  Functional change: ongoing    Pain: 0/10  Location: bilateral knee      Objective     Pt wearing a mask.      Gabriel received therapeutic exercises to develop endurance and ROM for 12 minutes including:    Recumbent Bike 5 minutes; Level 2   Education - POC / HEP     Not today:   Upright Bike 8 minutes; Level 2 (held today due to pt noted be slight shortness of breath)    Gabriel participated in neuromuscular re-education activities to improve: Coordination, Kinesthetic, and Sense for 28 minutes. The following activities were included:    Straight leg raise 2 x 10 reps 3# bilateral   Sidelying Hip Abduction 3# 3 x 10 bilateral    SL Hip ER 3# 3 x 10 B  Standing Hip extension on shuttle; 3 x 10 12# B    Not today:   Precor Leg Ext 3 x 10 15# SL  Precor Leg Curl 3 x 10 40# DL    therapeutic activities to improve functional performance for 15 minutes, including:    DL Squat with BTB around  knees 3 x 8   Precor Single Leg Press 2 x 10 reps 62# GTB around knees  Sled Push Fwd<>Bwd 3 x 10 yards 0# added    Home Exercises Provided and Patient Education Provided     Education provided:   - Home exercise program     Written Home Exercises Provided: yes.  Exercises were reviewed and Gabriel was able to demonstrate them prior to the end of the session.  Gabriel demonstrated good  understanding of the education provided.     See EMR under Patient Instructions for exercises provided 9/30/2024.    Assessment     Gabriel did well today. She expressed interest in starting back PT as knee pain is improved although not fully resolved. Long term, she verbalizes understanding that she will need to adopt a home exercise program to manage her knee pain as PFPS can linger. She is improving objectively each session in her tolerance to more complex exercise. She is agreeable to transition to a home program in the next week or so and begin eval for LBP. I am okay with this as our PT programs for back pain involved LE exercises as well so I think this will be all encompassing. Furthermore, I advised her to speak with her doctor regarding continued cough. Pt agreeable.     Gabriel Is progressing well towards her goals.   Pt prognosis is Fair.     Pt will continue to benefit from skilled outpatient physical therapy to address the deficits listed in the problem list box on initial evaluation, provide pt/family education and to maximize pt's level of independence in the home and community environment.     Pt's spiritual, cultural and educational needs considered and pt agreeable to plan of care and goals.     Anticipated barriers to physical therapy: chronicity    Goals:  GOALS: Short Term Goals:  4 weeks  1.Report decreased knee pain  < / =  4/10 with activity to increase tolerance for work  2. Increase strength by 1/3 MMT grade in bilateral hips  to increase tolerance for ADL and work activities.  3. Pt to tolerate HEP  to improve ROM and independence with ADL's     Long Term Goals: 8 weeks  1.Report decreased knee pain < / = 2/10 with activity to increase tolerance for work.  2.Increase strength to >/= 4+/5 in bilateral hips  to increase tolerance for ADL and work activities.  3. Pt will report at CJ level (20-40% impaired) on FOTO knee to demonstrate increase in LE function with every day tasks.   Plan      Plan of care Certification: 9/27/2024 to 11/22/2024.    Khoi Holland, PT

## 2024-10-23 ENCOUNTER — CLINICAL SUPPORT (OUTPATIENT)
Dept: REHABILITATION | Facility: HOSPITAL | Age: 39
End: 2024-10-23
Payer: OTHER GOVERNMENT

## 2024-10-23 DIAGNOSIS — R29.898 DECREASED STRENGTH OF LOWER EXTREMITY: Primary | ICD-10-CM

## 2024-10-23 PROCEDURE — 97112 NEUROMUSCULAR REEDUCATION: CPT | Mod: PN

## 2024-10-23 PROCEDURE — 97110 THERAPEUTIC EXERCISES: CPT | Mod: PN

## 2024-10-23 PROCEDURE — 97530 THERAPEUTIC ACTIVITIES: CPT | Mod: PN

## 2024-10-23 NOTE — PROGRESS NOTES
Physical Therapy Treatment Note     Name: Gabriel Dong [facility] Number: [id number]    Therapy Diagnosis:   Encounter Diagnosis   Name Primary?    Decreased strength of lower extremity Yes       Physician: Rachid Duarte,*    Visit Date: 10/23/2024    Evaluation Date: 9/27/2024  Authorization Period Expiration: 12/31/2024  Plan of Care Expiration: 11/22/2024  Progress Note Due: 10/18/2024  Date of Surgery: N/A  Visit # / Visits authorized: 6 / 20 (Plan of Care 2/20)  FOTO: 1/5     Precautions: Standard      Time In: 7:00 am  Time Out: 7:53 am  Total Billable Time: 53 minutes    Subjective     Pt reports: she felt okay after last session. No increase in pain.     She was compliant with home exercise program.  Response to previous treatment: felt fine  Functional change: ongoing    Pain: 0/10  Location: bilateral knee      Objective     Pt wearing a mask.      Gabriel received therapeutic exercises to develop endurance and ROM for 10 minutes including:    Recumbent Bike 7 minutes; Level 2   Education - POC / HEP     Not today:   Upright Bike 8 minutes; Level 2 (held today due to pt noted be slight shortness of breath)    Gabriel participated in neuromuscular re-education activities to improve: Coordination, Kinesthetic, and Sense for 20 minutes. The following activities were included:    Straight leg raise 2 x 10 reps 3# bilateral   Sidelying Hip Abduction 3# 3 x 10 bilateral    DL Bridge 3 x 10   Standing Hip extension on shuttle; 3 x 10 12# B    Precor Leg Curl 3 x 10 40# DL  Seated Hip Abd 75# 3 x 10    therapeutic activities to improve functional performance for 23 minutes, including:    DL Squat with BTB around knees 3 x 10  Single Leg Shuttle Press 2 x 10 reps 62# GTB around knees  Sled Push Fwd<>Bwd 3 x 10 yards 0# added  RDL 10# KB 3 x 8 B     Home Exercises Provided and Patient Education Provided     Education provided:   - Home exercise program     Written Home Exercises Provided:  yes.  Exercises were reviewed and Gabriel was able to demonstrate them prior to the end of the session.  Gabriel demonstrated good  understanding of the education provided.     See EMR under Patient Instructions for exercises provided 9/30/2024.    Assessment     Gabriel did well today. No setbacks within session. Plan to d/c to home program next week and transition to PT for low back.     Gabriel Is progressing well towards her goals.   Pt prognosis is Fair.     Pt will continue to benefit from skilled outpatient physical therapy to address the deficits listed in the problem list box on initial evaluation, provide pt/family education and to maximize pt's level of independence in the home and community environment.     Pt's spiritual, cultural and educational needs considered and pt agreeable to plan of care and goals.     Anticipated barriers to physical therapy: chronicity    Goals:  GOALS: Short Term Goals:  4 weeks  1.Report decreased knee pain  < / =  4/10 with activity to increase tolerance for work  2. Increase strength by 1/3 MMT grade in bilateral hips  to increase tolerance for ADL and work activities.  3. Pt to tolerate HEP to improve ROM and independence with ADL's     Long Term Goals: 8 weeks  1.Report decreased knee pain < / = 2/10 with activity to increase tolerance for work.  2.Increase strength to >/= 4+/5 in bilateral hips  to increase tolerance for ADL and work activities.  3. Pt will report at CJ level (20-40% impaired) on FOTO knee to demonstrate increase in LE function with every day tasks.   Plan      Plan of care Certification: 9/27/2024 to 11/22/2024.    Khoi Holland, PT

## 2024-10-28 ENCOUNTER — CLINICAL SUPPORT (OUTPATIENT)
Dept: REHABILITATION | Facility: HOSPITAL | Age: 39
End: 2024-10-28
Payer: COMMERCIAL

## 2024-10-28 DIAGNOSIS — R29.898 DECREASED STRENGTH OF LOWER EXTREMITY: Primary | ICD-10-CM

## 2024-10-28 PROCEDURE — 97530 THERAPEUTIC ACTIVITIES: CPT | Mod: PN

## 2024-10-28 PROCEDURE — 97112 NEUROMUSCULAR REEDUCATION: CPT | Mod: PN

## 2024-10-29 ENCOUNTER — PATIENT MESSAGE (OUTPATIENT)
Dept: PSYCHIATRY | Facility: CLINIC | Age: 39
End: 2024-10-29
Payer: COMMERCIAL

## 2024-10-30 ENCOUNTER — TELEPHONE (OUTPATIENT)
Dept: PSYCHIATRY | Facility: CLINIC | Age: 39
End: 2024-10-30
Payer: COMMERCIAL

## 2024-10-30 ENCOUNTER — CLINICAL SUPPORT (OUTPATIENT)
Dept: REHABILITATION | Facility: HOSPITAL | Age: 39
End: 2024-10-30
Payer: COMMERCIAL

## 2024-10-30 DIAGNOSIS — R29.898 DECREASED STRENGTH OF LOWER EXTREMITY: ICD-10-CM

## 2024-10-30 DIAGNOSIS — M54.42 CHRONIC BILATERAL LOW BACK PAIN WITH BILATERAL SCIATICA: ICD-10-CM

## 2024-10-30 DIAGNOSIS — M54.41 CHRONIC BILATERAL LOW BACK PAIN WITH BILATERAL SCIATICA: ICD-10-CM

## 2024-10-30 DIAGNOSIS — G89.29 CHRONIC BILATERAL LOW BACK PAIN WITH BILATERAL SCIATICA: ICD-10-CM

## 2024-10-30 DIAGNOSIS — M54.50 LUMBAR PAIN: Primary | ICD-10-CM

## 2024-10-30 PROCEDURE — 97112 NEUROMUSCULAR REEDUCATION: CPT | Mod: PN

## 2024-10-30 PROCEDURE — 97162 PT EVAL MOD COMPLEX 30 MIN: CPT | Mod: PN

## 2024-11-04 ENCOUNTER — CLINICAL SUPPORT (OUTPATIENT)
Dept: REHABILITATION | Facility: HOSPITAL | Age: 39
End: 2024-11-04
Payer: COMMERCIAL

## 2024-11-04 ENCOUNTER — OFFICE VISIT (OUTPATIENT)
Dept: ORTHOPEDICS | Facility: CLINIC | Age: 39
End: 2024-11-04
Payer: OTHER GOVERNMENT

## 2024-11-04 ENCOUNTER — TELEPHONE (OUTPATIENT)
Dept: PSYCHIATRY | Facility: CLINIC | Age: 39
End: 2024-11-04
Payer: COMMERCIAL

## 2024-11-04 VITALS — RESPIRATION RATE: 18 BRPM | WEIGHT: 189.63 LBS | BODY MASS INDEX: 32.37 KG/M2 | HEIGHT: 64 IN

## 2024-11-04 DIAGNOSIS — R29.898 DECREASED STRENGTH OF LOWER EXTREMITY: Primary | ICD-10-CM

## 2024-11-04 DIAGNOSIS — M17.0 PRIMARY OSTEOARTHRITIS OF KNEES, BILATERAL: Primary | ICD-10-CM

## 2024-11-04 DIAGNOSIS — M54.50 LUMBAR PAIN: ICD-10-CM

## 2024-11-04 DIAGNOSIS — M17.10 ARTHRITIS OF KNEE: Primary | ICD-10-CM

## 2024-11-04 PROCEDURE — 99214 OFFICE O/P EST MOD 30 MIN: CPT | Mod: S$PBB,,, | Performed by: ORTHOPAEDIC SURGERY

## 2024-11-04 PROCEDURE — 99213 OFFICE O/P EST LOW 20 MIN: CPT | Mod: PBBFAC,PO | Performed by: ORTHOPAEDIC SURGERY

## 2024-11-04 PROCEDURE — 99999 PR PBB SHADOW E&M-EST. PATIENT-LVL III: CPT | Mod: PBBFAC,,, | Performed by: ORTHOPAEDIC SURGERY

## 2024-11-04 PROCEDURE — 97110 THERAPEUTIC EXERCISES: CPT | Mod: PN

## 2024-11-04 PROCEDURE — 97112 NEUROMUSCULAR REEDUCATION: CPT | Mod: PN

## 2024-11-04 NOTE — PROGRESS NOTES
"[facility]   Physical Therapy Treatment Note      Name: Gabriel Dong [facility]  Clinic Number: [id number]    Therapy Diagnosis:   Encounter Diagnoses   Name Primary?    Decreased strength of lower extremity Yes    Lumbar pain      Physician: Garth Jang MD    Visit Date: 11/4/2024      Physician Orders: PT Eval and Treat   Medical Diagnosis from Referral: M54.42,M54.41,G89.29 (ICD-10-CM) - Chronic bilateral low back pain with bilateral sciatica   Evaluation Date: 10/30/2024  Authorization Period Expiration: 10/01/2025  Plan of Care Expiration: 12/13/2024  Progress Note Due: 11/22/2024  Date of Surgery: N/A  Visit # / Visits authorized: 1/ 20  FOTO: 1/5       Precautions: Standard      Time In: 7:05  Time Out: 8:00  Total Billable Time: 55 minutes    PTA Visit #: 0/5       Subjective     Patient reports: that her and her  did a lot of house work over the weekend and she had pain with it. The HEP did help periodically, but she is feeling some pain and soreness from all the physical activity over the weekend.     She was compliant with home exercise program.  Response to previous treatment: No notable response  Functional change: She found temporary relief with HEP    Pain: 8/10  Location: bilateral back      Objective      Objective Measures updated at progress report unless specified.     Treatment     Gabriel received the treatments listed below:      therapeutic exercises to develop strength, endurance, ROM, flexibility, posture, and core stabilization for 17 minutes including:  Nu Step [id number]  Prone on elbow 20x10"  Open books 15x5" bilateral    Not today:  Pallof press 3x10 bilateral    manual therapy techniques: Joint mobilizations, Manual traction, Myofacial release, and Soft tissue Mobilization were applied to the: lumbar spine for 00 minutes, including:  None today    neuromuscular re-education activities to improve: Balance, Coordination, Proprioception, " "and Posture for 38 minutes. The following activities were included:  Banded pull down with clamshell 3x10 5" holds  Modified side plank 3x30s bilateral  Bird dog 3x10 bilateral   Pallof press 2x10 bilateral    Not today:  Sciatic nerve glide 1x20 bilateral    therapeutic activities to improve functional performance for 00  minutes, including:  None today      Patient Education and Home Exercises       Education provided:   - Findings; prognosis and plan of care  - Home exercise program  - Modality options  - Therapist contact information    Written Home Exercises Provided: Pt instructed to continue prior HEP. Exercises were reviewed and Gabriel was able to demonstrate them prior to the end of the session.  Gabriel demonstrated good  understanding of the education provided. See Electronic Medical Record under Patient Instructions for exercises provided during therapy sessions    Assessment     Gabriel presents to therapy with increased symptoms. She displayed difficulty with side planks on knee and forearm, but improved when regressed to hand on side of the mat modification. She demonstrated improved pelvic proprioception with verbal and tactile cueing.     Gabriel Is progressing well towards her goals.   Patient prognosis is Good.     Patient will continue to benefit from skilled outpatient physical therapy to address the deficits listed in the problem list box on initial evaluation, provide pt/family education and to maximize pt's level of independence in the home and community environment.     Patient's spiritual, cultural and educational needs considered and pt agreeable to plan of care and goals.     Anticipated barriers to physical therapy: None    Goals:   Short Term Goals:  3 weeks  1.Report decreased lumbar pain  < / =  6/10 with sitting and after physical activity to increase tolerance for ADLs.  2. Increase strength by 1/3 MMT grade in bilateral hips  to increase tolerance for ADL and work " activities.  4. Pt to tolerate HEP to improve ROM and independence with ADL's     Long Term Goals: 6 weeks  1.Report decreased lumbar pain < / = 4/10 with sitting and after physical activity to increase tolerance for ADLs  3.Increase strength to 4+/5 in  bilateral hips  to increase tolerance for ADL and work activities.  4. Pt will report at </=38% on FOTO  to demonstrate increase in LE function with every day tasks.     Plan     Outpatient Physical Therapy 2 times weekly for 6 weeks to include the following interventions: Manual Therapy, Neuromuscular Re-ed, Patient Education, Self Care, Therapeutic Activities, and Therapeutic Exercise.     Butch Underwood, PT

## 2024-11-04 NOTE — PROGRESS NOTES
Past Medical History:   Diagnosis Date    Abnormal Pap smear of cervix 2006    Asthma     Breast disorder        Past Surgical History:   Procedure Laterality Date    BREAST BIOPSY Left 2011    COLPOSCOPY      laproscopy          Current Outpatient Medications   Medication Sig    albuterol (PROVENTIL/VENTOLIN HFA) 90 mcg/actuation inhaler Inhale 2 puffs into the lungs every 6 (six) hours as needed for Wheezing.    B.coagul,subtilis/inulin/vit C (CULTURELLE PROBIOTIC-PREBIOTIC ORAL)     betamethasone dipropionate 0.05 % cream Apply topically 2 (two) times daily. Use with Minoxidil    cetirizine (ZYRTEC) 10 MG tablet Take 1 tablet (10 mg total) by mouth once daily.    DULoxetine (CYMBALTA) 60 MG capsule Take 1 capsule (60 mg total) by mouth once daily.    fluticasone propionate (FLONASE) 50 mcg/actuation nasal spray 1 spray (50 mcg total) by Each Nostril route once daily.    loratadine 10 mg Cap     MELATONIN ORAL Take by mouth.    meloxicam (MOBIC) 15 MG tablet Take 1 tablet (15 mg total) by mouth once daily. For back pain/Knee pain, take with food. Do not take with Ibuprofen/Motrin/Aleve    minoxidiL (ROGAINE) 2 % external solution Apply topically 2 (two) times daily. For hair loss    multivitamin capsule Take 1 capsule by mouth once daily.     No current facility-administered medications for this visit.       Review of patient's allergies indicates:   Allergen Reactions    Pollen extracts Other (See Comments)       Family History   Problem Relation Name Age of Onset    Diabetes Father         Social History     Socioeconomic History    Marital status:    Tobacco Use    Smoking status: Never    Smokeless tobacco: Never   Substance and Sexual Activity    Alcohol use: No    Drug use: No    Sexual activity: Yes     Partners: Male     Birth control/protection: None     Social Drivers of Health     Financial Resource Strain: Low Risk  (8/19/2024)    Overall Financial Resource Strain (CARDIA)     Difficulty of  Paying Living Expenses: Not hard at all   Food Insecurity: No Food Insecurity (8/19/2024)    Hunger Vital Sign     Worried About Running Out of Food in the Last Year: Never true     Ran Out of Food in the Last Year: Never true   Physical Activity: Unknown (8/19/2024)    Exercise Vital Sign     Days of Exercise per Week: 5 days   Stress: Stress Concern Present (8/19/2024)    [employer] - Occupational Stress Questionnaire     Feeling of Stress : Very much   Housing Stability: Unknown (8/19/2024)    Housing Stability Vital Sign     Unable to Pay for Housing in the Last Year: No       Chief Complaint:   No chief complaint on file.      History of present illness:  39-year-old female seen for bilateral knee pain.  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  Pain started years ago.  Knee feels unstable.  Patient hears popping and rubbing.  Pain along the front of her knee.  She has tried over-the-counter braces which do not help.  Pain is a 8/10.  Had an MRI done recently which just show some early chondromalacia of the patella.  Ordered some physical therapy at her last visit.  Has not really helped a whole lot.        Review of Systems:  Musculoskeletal:  See HPI        Physical Examination:    Vital Signs:  There were no vitals filed for this visit.    There is no height or weight on file to calculate BMI.    This a well-developed, well nourished patient in no acute distress.  They are alert and oriented and cooperative to examination.  Pt. walks without an antalgic gait.      Examination of bilateral knees shows no rashes or erythema. There are no masses ecchymosis or effusion. Patient has full range of motion from 0-130°. Patient is nontender to palpation over lateral joint line and nontender to palpation over the medial joint line.  Knee is stable to varus and valgus stress. 5 out of 5 motor strength. Palpable distal pulses. Intact light touch sensation. Negative Patellofemoral  crepitus.  Mild lateral tilt      X-rays:  X-rays of the right knee is reviewed which shows mild lateral tilt of the patella    MRI of the left knee is reviewed and interpreted:1. There is trace fluid superficial to the patellar tendon likely reflecting mild peritendinitis.  2. Mild sprain of the MCL.  3. Focal near full thickness fissure of the medial patellar facet articular cartilage with adjacent subchondral cystic degeneration of the patella.         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  discussed how she might be a good candidate for hyaluronic acid if they continue to bother her.  We will get authorization for this in addition her continuing with the exercise program    The patient has tried a self guided exercise program that has included walking without significant improvement. Minimal relief with tylenol or OTC Nsaids. Reports less than 8 weeks relief with IA steroid injection. Kellgren Acosta scale of 3. They are not receiving another HA injectable at this time. I will precert for gel injection.               All previous pertinent notes including ER visits, physical therapy visits, other orthopedic visits as well as other care for the same musculoskeletal problem were reviewed.  All pertinent lab values and previous imaging was reviewed pertinent to the current visit.    This note was created using [employer] voice recognition software that occasionally misinterpreted phrases or words.    Consult note is delivered via Epic messaging service.

## 2024-11-05 ENCOUNTER — CLINICAL SUPPORT (OUTPATIENT)
Dept: BEHAVIORAL HEALTH | Facility: CLINIC | Age: 39
End: 2024-11-05
Payer: COMMERCIAL

## 2024-11-05 DIAGNOSIS — F41.1 GAD (GENERALIZED ANXIETY DISORDER): Primary | ICD-10-CM

## 2024-11-05 DIAGNOSIS — F41.9 ANXIETY: ICD-10-CM

## 2024-11-05 PROCEDURE — 90791 PSYCH DIAGNOSTIC EVALUATION: CPT | Mod: 95,,, | Performed by: COUNSELOR

## 2024-11-05 NOTE — PROGRESS NOTES
Primary Care Behavioral Health Integration: Initial  Date:  11/5/2024  Referral Source:  Bonnie Murillo DO  Length of Appointment: 30  The patient location is:  Home  The patient phone number is: [phone]  Visit type: Virtual visit with synchronous audio and video    Each patient to whom he or she provides medical services by telemedicine is:  (1) informed of the relationship between the physician and patient and the respective role of any other health care provider with respect to management of the patient; and (2) notified that he or she may decline to receive medical services by telemedicine and may withdraw from such care at any time.    Chief Complaint/Reason for Encounter:  Anxiety    History of Present Illness: Waltjarettbrett Letitiaubaldo Salvador, a 39 y.o. female referred by Bonnie Murillo DO.  Patient was seen, examined and chart was reviewed. Met with patient.     Current Session: Patient reported wanting to establish care since relocating back to [place]. At PCP visit an assessment was given pt scored for depression although pt doesn't think she's depressed. Pt admits to having anxiety with having 4 panic attacks. Last panic attack was in 2020. Has a lot of stress at work working for the [employer] and is . Was medically released from  in 2009. Pt was denied  benefits based on her illnesses. At that time pt says she was depressed. Sleep has been very bad with difficulty falling and staying asleep. Is very hypervigilant bc of being deployed. Will continue to explore PTSD from .  also works for the government. They have 2 girls from . 20 yo is in college in [place] and is doing well. 13 yo started high school locally and seems to be doing well and is very intelligent. Pt has no children of her own. Advised to journal her feelings.    Past Medical History:   Diagnosis Date    Abnormal Pap smear of cervix 2006    Asthma     Breast disorder           Current Outpatient Medications:     albuterol (PROVENTIL/VENTOLIN HFA) 90 mcg/actuation inhaler, Inhale 2 puffs into the lungs every 6 (six) hours as needed for Wheezing., Disp: 51 each, Rfl: 2    B.coagul,subtilis/inulin/vit C (CULTURELLE PROBIOTIC-PREBIOTIC ORAL), , Disp: , Rfl:     betamethasone dipropionate 0.05 % cream, Apply topically 2 (two) times daily. Use with Minoxidil, Disp: 45 g, Rfl: 4    cetirizine (ZYRTEC) 10 MG tablet, Take 1 tablet (10 mg total) by mouth once daily., Disp: 30 tablet, Rfl: 0    DULoxetine (CYMBALTA) 60 MG capsule, Take 1 capsule (60 mg total) by mouth once daily., Disp: 30 capsule, Rfl: 1    fluticasone propionate (FLONASE) 50 mcg/actuation nasal spray, 1 spray (50 mcg total) by Each Nostril route once daily., Disp: 16 g, Rfl: 0    loratadine 10 mg Cap, , Disp: , Rfl:     MELATONIN ORAL, Take by mouth., Disp: , Rfl:     meloxicam (MOBIC) 15 MG tablet, Take 1 tablet (15 mg total) by mouth once daily. For back pain/Knee pain, take with food. Do not take with Ibuprofen/Motrin/Aleve, Disp: 30 tablet, Rfl: 5    minoxidiL (ROGAINE) 2 % external solution, Apply topically 2 (two) times daily. For hair loss, Disp: 60 mL, Rfl: 4    multivitamin capsule, Take 1 capsule by mouth once daily., Disp: , Rfl:     Current symptoms:  Depression Symptoms: denies.  Anxiety Symptoms: excessive worrying, restlessness, and panic attacks.  Sleep Difficulties: frequent night time awakening, difficulty falling asleep, and non-restful sleep.  Manic Symptoms:  denies.  Psychosis: denies .    Risk assessment:  Patient reports no suicidal ideation  Patient reports no homicidal ideation  Patient reports no self-injurious behavior  Patient reports no violent behavior    Patient advised to call [phone] or present the the nearest ED if they experience suicidal or homicidal ideation, plan or intent.      Psychiatric History:  Diagnosis:    Current Psychiatric Medication: Yes    Medication Trial History:   Medication Trials: No    Outpatient Treatment: Yes    Inpatient Treatment: No   Suicide Attempts: No   Access to Firearms: Yes -    History of Trauma: Yes    Family Psychiatric History:      Current and Past Substance Use:  Alcohol: Patient denies alcohol use.    Drugs: Denied.   Nicotine: denied   Caffeine:  Stopped 2 months      Mental Status Exam  General Appearance:  appears stated age, neatly dressed, well groomed   Speech: normal tone, normal rate, normal pitch, normal volume      Level of Cooperation: cooperative      Thought Processes: linear, logical, goal-directed   Mood: anxious      Thought Content: {relevant and appropriate   Affect: congruent and appropriate   Orientation: Oriented x4   Memory/Attention/Concentration: No gross cognitive deficits made evident during conversation   Judgment & Insight: poor   Language  intact          No data to display                     No data to display                Impression: Initial appointment focused on gathering history, identifying treatment goals and developing a treatment plan.      Diagnosis:  1. CAMILLA (generalized anxiety disorder)        2. Anxiety  Ambulatory referral/consult to Primary Care Behavioral Health (Non-Opioids)          Treatment Goals and Plan:   Anxiety: reducing negative automatic thoughts, reducing physical symptoms of anxiety, and reducing time spent worrying (<30 minutes/day)    Future treatment will utilize CBT and Solution-focused Therapy.      Return to Clinic: No follow-ups on file.

## 2024-11-06 ENCOUNTER — PATIENT MESSAGE (OUTPATIENT)
Dept: BEHAVIORAL HEALTH | Facility: CLINIC | Age: 39
End: 2024-11-06
Payer: COMMERCIAL

## 2024-11-06 ENCOUNTER — CLINICAL SUPPORT (OUTPATIENT)
Dept: REHABILITATION | Facility: HOSPITAL | Age: 39
End: 2024-11-06
Payer: COMMERCIAL

## 2024-11-06 DIAGNOSIS — R29.898 DECREASED STRENGTH OF LOWER EXTREMITY: Primary | ICD-10-CM

## 2024-11-06 DIAGNOSIS — M54.50 LUMBAR PAIN: ICD-10-CM

## 2024-11-06 PROCEDURE — 97110 THERAPEUTIC EXERCISES: CPT | Mod: PN

## 2024-11-06 PROCEDURE — 97112 NEUROMUSCULAR REEDUCATION: CPT | Mod: PN

## 2024-11-06 PROCEDURE — 97140 MANUAL THERAPY 1/> REGIONS: CPT | Mod: PN

## 2024-11-06 NOTE — PROGRESS NOTES
"[facility]   Physical Therapy Treatment Note      Name: Gabriel Dong [facility]  Clinic Number: [id number]    Therapy Diagnosis:   Encounter Diagnoses   Name Primary?    Decreased strength of lower extremity Yes    Lumbar pain      Physician: Garth Jang MD    Visit Date: 11/6/2024      Physician Orders: PT Eval and Treat   Medical Diagnosis from Referral: M54.42,M54.41,G89.29 (ICD-10-CM) - Chronic bilateral low back pain with bilateral sciatica   Evaluation Date: 10/30/2024  Authorization Period Expiration: 10/01/2025  Plan of Care Expiration: 12/13/2024  Progress Note Due: 11/22/2024  Date of Surgery: N/A  Visit # / Visits authorized: 2/ 20  FOTO: 1/5       Precautions: Standard      Time In: 7:04  Time Out: 7:59  Total Billable Time: 55 minutes    PTA Visit #: 0/5       Subjective     Patient reports: that she had increased symptoms starting last night that caused her to not fall asleep. Her symptoms are persisting.     She was compliant with home exercise program.  Response to previous treatment: No notable response  Functional change: She found temporary relief with HEP    Pain: 8/10  Location: bilateral back      Objective      SIJ SLR:  TA contraction did not change her symptoms  Compression decreased her symptoms bilaterally    Treatment     Gabriel received the treatments listed below:      therapeutic exercises to develop strength, endurance, ROM, flexibility, posture, and core stabilization for 30 minutes including:  Nu Step [id number]  Prone on elbow 20x10"  Open books 15x5" bilateral  Education on SI belt  Assessment above     manual therapy techniques: Joint mobilizations, Manual traction, Myofacial release, and Soft tissue Mobilization were applied to the: lumbar spine for 08 minutes, including:  Thoracic and lumbar PA's grade 1-2 for pain modulation    neuromuscular re-education activities to improve: Balance, Coordination, Proprioception, and Posture for 17 " "minutes. The following activities were included:  Iso Pallof press with lateral band walk 2x10 bilateral  Cat/cow/child's pose 10x10"    Not today:  Sciatic nerve glide 1x20 bilateral  Banded pull down with clamshell 3x10 5" holds  Modified side plank 3x30s bilateral  Bird dog 3x10 bilateral     therapeutic activities to improve functional performance for 00  minutes, including:  None today      Patient Education and Home Exercises       Education provided:   - Findings; prognosis and plan of care  - Home exercise program  - Modality options  - Therapist contact information    Written Home Exercises Provided: Pt instructed to continue prior HEP. Exercises were reviewed and Gabriel was able to demonstrate them prior to the end of the session.  Gabriel demonstrated good  understanding of the education provided. See Electronic Medical Record under Patient Instructions for exercises provided during therapy sessions    Assessment     Gabriel presents to therapy with increased symptoms. 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 She was educated on wearing an SI belt and demonstrated understanding. Her symptoms decreased while wearing the belt during the session, but continued to be present. After further discussion, she noted having some defensive floor training on a hard surface yesterday, which may have led to the increase in symptoms. It was discussed to have an appropriate warm up beforehand and stretching afterwards if other modifications cannot be made.     Gabriel Is progressing well towards her goals.   Patient prognosis is Good.     Patient will continue to benefit from skilled outpatient physical therapy to address the deficits listed in the problem list box on initial evaluation, provide pt/family education and to maximize pt's level of independence in the home and community environment.     Patient's spiritual, cultural and educational needs considered and pt agreeable to plan of " care and goals.     Anticipated barriers to physical therapy: None    Goals:   Short Term Goals:  3 weeks  1.Report decreased lumbar pain  < / =  6/10 with sitting and after physical activity to increase tolerance for ADLs.  2. Increase strength by 1/3 MMT grade in bilateral hips  to increase tolerance for ADL and work activities.  4. Pt to tolerate HEP to improve ROM and independence with ADL's     Long Term Goals: 6 weeks  1.Report decreased lumbar pain < / = 4/10 with sitting and after physical activity to increase tolerance for ADLs  3.Increase strength to 4+/5 in  bilateral hips  to increase tolerance for ADL and work activities.  4. Pt will report at </=38% on FOTO  to demonstrate increase in LE function with every day tasks.     Plan     Outpatient Physical Therapy 2 times weekly for 6 weeks to include the following interventions: Manual Therapy, Neuromuscular Re-ed, Patient Education, Self Care, Therapeutic Activities, and Therapeutic Exercise.     Butch Underwood, PT

## 2024-11-13 ENCOUNTER — CLINICAL SUPPORT (OUTPATIENT)
Dept: REHABILITATION | Facility: HOSPITAL | Age: 39
End: 2024-11-13
Payer: COMMERCIAL

## 2024-11-13 ENCOUNTER — HOSPITAL ENCOUNTER (OUTPATIENT)
Dept: RADIOLOGY | Facility: HOSPITAL | Age: 39
Discharge: HOME | End: 2024-11-13
Payer: COMMERCIAL

## 2024-11-13 DIAGNOSIS — R29.898 DECREASED STRENGTH OF LOWER EXTREMITY: Primary | ICD-10-CM

## 2024-11-13 DIAGNOSIS — M54.50 LUMBAR PAIN: ICD-10-CM

## 2024-11-13 DIAGNOSIS — J18.9 PNEUMONIA OF RIGHT UPPER LOBE DUE TO INFECTIOUS ORGANISM: ICD-10-CM

## 2024-11-13 PROCEDURE — 97110 THERAPEUTIC EXERCISES: CPT | Mod: PN

## 2024-11-13 PROCEDURE — 71046 X-RAY EXAM CHEST 2 VIEWS: CPT | Mod: 26,,, | Performed by: RADIOLOGY

## 2024-11-13 PROCEDURE — 71046 X-RAY EXAM CHEST 2 VIEWS: CPT | Mod: TC

## 2024-11-13 PROCEDURE — 97112 NEUROMUSCULAR REEDUCATION: CPT | Mod: PN

## 2024-12-11 ENCOUNTER — CLINICAL SUPPORT (OUTPATIENT)
Dept: REHABILITATION | Facility: HOSPITAL | Age: 39
End: 2024-12-11
Payer: COMMERCIAL

## 2024-12-11 DIAGNOSIS — M54.50 LUMBAR PAIN: ICD-10-CM

## 2024-12-11 DIAGNOSIS — R29.898 DECREASED STRENGTH OF LOWER EXTREMITY: Primary | ICD-10-CM

## 2024-12-11 PROCEDURE — 97110 THERAPEUTIC EXERCISES: CPT | Mod: PN

## 2024-12-11 PROCEDURE — 97112 NEUROMUSCULAR REEDUCATION: CPT | Mod: PN

## 2024-12-11 NOTE — PROGRESS NOTES
[facility]   Physical Therapy Re-assessment Note      Name: Gabriel Dong [facility]  Clinic Number: [id number]    Therapy Diagnosis:   Encounter Diagnoses   Name Primary?    Decreased strength of lower extremity Yes    Lumbar pain          Physician: Garth Jang MD    Visit Date: 12/11/2024      Physician Orders: PT Eval and Treat   Medical Diagnosis from Referral: M54.42,M54.41,G89.29 (ICD-10-CM) - Chronic bilateral low back pain with bilateral sciatica   Evaluation Date: 10/30/2024  Authorization Period Expiration: 10/01/2025  Plan of Care Expiration: 12/13/2024  Progress Note Due: 11/22/2024  Date of Surgery: N/A  Visit # / Visits authorized: 4/ 20  FOTO: 2/5       Precautions: Standard      Time In: 7:08  Time Out: 7:55  Total Billable Time: 47 minutes    PTA Visit #: 0/5       Subjective     Patient reports: that she hasn't noticed much improvement on the day to day so far. Notes having a lot of stress that isn't helping her pain.     She was compliant with home exercise program.  Response to previous treatment: No notable response  Functional change: She found temporary relief with HEP    Pain: 8/10  Location: bilateral back      Objective      Lumbar Range of Motion:     Limitations Pain   Flexion 100%    N         Extension 75%    pain         Left Side Bending 100% No pain         Right Side Bending 100% No pain           Lower Extremity Strength  Right LE   Left LE     Quadriceps: 5/5 Quadriceps: 5/5   Hamstrings: 5/5 Hamstrings: 4+5   Iliospoas: 4+/5 Iliospoas: 4+/5   Hip extension:  4+/5 Hip extension: 4+/5   Hip abduction: 4+/5 Hip abduction: 4+/5   Hip ER:  5/5 Hip ER: 5/5   Hip IR: 5/5 Hip IR: 5/5   Ankle dorsiflexion: 5/5 Ankle dorsiflexion: 5/5   Ankle plantarflexion: 5/5 Ankle plantarflexion: 5/5           Special Tests:  -SLR Test: (+) bilaterally   -Slump Test: (+) right     SI Special Tests:   Distraction: (-)  Compression: (-)  SI thigh thrust:  "(-)  Sacral thrust: (+)  Cr's sign: (+)    Treatment     Gabriel received the treatments listed below:      therapeutic exercises to develop strength, endurance, ROM, flexibility, posture, and core stabilization for 37 minutes including:  Assessment above  Patient education on Healthy Back program and pain science    Not today:  Nu Step [id number]  Prone on elbow 20x10"  Open books 15x5" bilateral    manual therapy techniques: Joint mobilizations, Manual traction, Myofacial release, and Soft tissue Mobilization were applied to the: lumbar spine for 00 minutes, including:  Thoracic and lumbar PA's grade 1-2 for pain modulation    neuromuscular re-education activities to improve: Balance, Coordination, Proprioception, and Posture for 10 minutes. The following activities were included:  Pallof press 3x10 bilateral  Modified side plank 3x30s bilateral  Modified high plank 3x30s    Not today:  Sciatic nerve glide 1x20 bilateral  Banded pull down with clamshell 3x10 5" holds  Bird dog 3x10 bilateral   Cat/cow/child's pose 10x10"  Lateral band walk GTB 3 laps x 10 yards  Posterior/anterior rotated innominate MET with dowel 3x6s bilateral    therapeutic activities to improve functional performance for 00  minutes, including:  None today      Patient Education and Home Exercises       Education provided:   - Findings; prognosis and plan of care  - Home exercise program  - Modality options  - Therapist contact information    Written Home Exercises Provided: Pt instructed to continue prior HEP. Exercises were reviewed and Gabriel was able to demonstrate them prior to the end of the session.  Gabriel demonstrated good  understanding of the education provided. See Electronic Medical Record under Patient Instructions for exercises provided during therapy sessions    Assessment     Gabriel presents to therapy with no change in symptoms. Her re-assessment showed improvements in lower extremity strength and lumbar range of " motion. However, she reports no change in her pain and still feels limited with her work and family life. Due to her symptomology and chronicity of this issue, she would be a good candidate for the Healthy Back Program. She would benefit from additional skilled therapy to improve the aforementioned deficits.     Gabriel Is progressing well towards her goals.   Patient prognosis is Good.     Patient will continue to benefit from skilled outpatient physical therapy to address the deficits listed in the problem list box on initial evaluation, provide pt/family education and to maximize pt's level of independence in the home and community environment.     Patient's spiritual, cultural and educational needs considered and pt agreeable to plan of care and goals.     Anticipated barriers to physical therapy: None    Goals:   Short Term Goals:  3 weeks  1.Report decreased lumbar pain  < / =  6/10 with sitting and after physical activity to increase tolerance for ADLs.  2. Increase strength by 1/3 MMT grade in bilateral hips  to increase tolerance for ADL and work activities.  4. Pt to tolerate HEP to improve ROM and independence with ADL's     Long Term Goals: 6 weeks  1.Report decreased lumbar pain < / = 4/10 with sitting and after physical activity to increase tolerance for ADLs  3.Increase strength to 4+/5 in  bilateral hips  to increase tolerance for ADL and work activities.  4. Pt will report at </=38% on FOTO  to demonstrate increase in LE function with every day tasks.     Plan     Plan of care Certification: 10/30/2024 to 02/07/2025.     Extend patient's plan of care to 2 times per week for 8 weeks to complete the Healthy Back Program.     Butch Underwood, PT

## 2024-12-12 NOTE — PLAN OF CARE
[facility]   Physical Therapy Re-assessment Note      Name: Gabriel Dong [facility]  Clinic Number: [id number]    Therapy Diagnosis:   Encounter Diagnoses   Name Primary?    Decreased strength of lower extremity Yes    Lumbar pain          Physician: Garth Jang MD    Visit Date: 12/11/2024      Physician Orders: PT Eval and Treat   Medical Diagnosis from Referral: M54.42,M54.41,G89.29 (ICD-10-CM) - Chronic bilateral low back pain with bilateral sciatica   Evaluation Date: 10/30/2024  Authorization Period Expiration: 10/01/2025  Plan of Care Expiration: 12/13/2024  Progress Note Due: 11/22/2024  Date of Surgery: N/A  Visit # / Visits authorized: 4/ 20  FOTO: 2/5       Precautions: Standard      Time In: 7:08  Time Out: 7:55  Total Billable Time: 47 minutes    PTA Visit #: 0/5       Subjective     Patient reports: that she hasn't noticed much improvement on the day to day so far. Notes having a lot of stress that isn't helping her pain.     She was compliant with home exercise program.  Response to previous treatment: No notable response  Functional change: She found temporary relief with HEP    Pain: 8/10  Location: bilateral back      Objective      Lumbar Range of Motion:     Limitations Pain   Flexion 100%    N         Extension 75%    pain         Left Side Bending 100% No pain         Right Side Bending 100% No pain           Lower Extremity Strength  Right LE   Left LE     Quadriceps: 5/5 Quadriceps: 5/5   Hamstrings: 5/5 Hamstrings: 4+5   Iliospoas: 4+/5 Iliospoas: 4+/5   Hip extension:  4+/5 Hip extension: 4+/5   Hip abduction: 4+/5 Hip abduction: 4+/5   Hip ER:  5/5 Hip ER: 5/5   Hip IR: 5/5 Hip IR: 5/5   Ankle dorsiflexion: 5/5 Ankle dorsiflexion: 5/5   Ankle plantarflexion: 5/5 Ankle plantarflexion: 5/5           Special Tests:  -SLR Test: (+) bilaterally   -Slump Test: (+) right     SI Special Tests:   Distraction: (-)  Compression: (-)  SI thigh thrust:  "(-)  Sacral thrust: (+)  Cr's sign: (+)    Treatment     Gabriel received the treatments listed below:      therapeutic exercises to develop strength, endurance, ROM, flexibility, posture, and core stabilization for 37 minutes including:  Assessment above  Patient education on Healthy Back program and pain science    Not today:  Nu Step [id number]  Prone on elbow 20x10"  Open books 15x5" bilateral    manual therapy techniques: Joint mobilizations, Manual traction, Myofacial release, and Soft tissue Mobilization were applied to the: lumbar spine for 00 minutes, including:  Thoracic and lumbar PA's grade 1-2 for pain modulation    neuromuscular re-education activities to improve: Balance, Coordination, Proprioception, and Posture for 10 minutes. The following activities were included:  Pallof press 3x10 bilateral  Modified side plank 3x30s bilateral  Modified high plank 3x30s    Not today:  Sciatic nerve glide 1x20 bilateral  Banded pull down with clamshell 3x10 5" holds  Bird dog 3x10 bilateral   Cat/cow/child's pose 10x10"  Lateral band walk GTB 3 laps x 10 yards  Posterior/anterior rotated innominate MET with dowel 3x6s bilateral    therapeutic activities to improve functional performance for 00  minutes, including:  None today      Patient Education and Home Exercises       Education provided:   - Findings; prognosis and plan of care  - Home exercise program  - Modality options  - Therapist contact information    Written Home Exercises Provided: Pt instructed to continue prior HEP. Exercises were reviewed and Gabriel was able to demonstrate them prior to the end of the session.  Gabriel demonstrated good  understanding of the education provided. See Electronic Medical Record under Patient Instructions for exercises provided during therapy sessions    Assessment     Gabriel presents to therapy with no change in symptoms. Her re-assessment showed improvements in lower extremity strength and lumbar range of " motion. However, she reports no change in her pain and still feels limited with her work and family life. Due to her symptomology and chronicity of this issue, she would be a good candidate for the Healthy Back Program. She would benefit from additional skilled therapy to improve the aforementioned deficits.     Gabriel Is progressing well towards her goals.   Patient prognosis is Good.     Patient will continue to benefit from skilled outpatient physical therapy to address the deficits listed in the problem list box on initial evaluation, provide pt/family education and to maximize pt's level of independence in the home and community environment.     Patient's spiritual, cultural and educational needs considered and pt agreeable to plan of care and goals.     Anticipated barriers to physical therapy: None    Goals:   Short Term Goals:  3 weeks  1.Report decreased lumbar pain  < / =  6/10 with sitting and after physical activity to increase tolerance for ADLs.  2. Increase strength by 1/3 MMT grade in bilateral hips  to increase tolerance for ADL and work activities.  4. Pt to tolerate HEP to improve ROM and independence with ADL's     Long Term Goals: 6 weeks  1.Report decreased lumbar pain < / = 4/10 with sitting and after physical activity to increase tolerance for ADLs  3.Increase strength to 4+/5 in  bilateral hips  to increase tolerance for ADL and work activities.  4. Pt will report at </=38% on FOTO  to demonstrate increase in LE function with every day tasks.     Plan     Plan of care Certification: 10/30/2024 to 02/07/2025.     Extend patient's plan of care to 2 times per week for 8 weeks to complete the Healthy Back Program.     Butch Underwood, PT

## 2024-12-24 ENCOUNTER — CLINICAL SUPPORT (OUTPATIENT)
Dept: REHABILITATION | Facility: HOSPITAL | Age: 39
End: 2024-12-24
Payer: COMMERCIAL

## 2024-12-24 DIAGNOSIS — M54.50 LUMBAR PAIN: ICD-10-CM

## 2024-12-24 DIAGNOSIS — R29.898 DECREASED STRENGTH OF LOWER EXTREMITY: Primary | ICD-10-CM

## 2024-12-24 PROCEDURE — 97112 NEUROMUSCULAR REEDUCATION: CPT | Mod: PN

## 2024-12-24 PROCEDURE — 97530 THERAPEUTIC ACTIVITIES: CPT | Mod: PN

## 2024-12-24 PROCEDURE — 97110 THERAPEUTIC EXERCISES: CPT | Mod: PN

## 2024-12-24 NOTE — PROGRESS NOTES
[facility] Physical Therapy Treatment      Name: Gabriel Salvador  Clinic Number: [id number]    Therapy Diagnosis:   Encounter Diagnoses   Name Primary?    Decreased strength of lower extremity Yes    Lumbar pain      Physician: Garth Jang MD    Visit Date: 2024    Physician Orders: PT Eval and Treat   Medical Diagnosis from Referral: M54.42,M54.41,G89.29 (ICD-10-CM) - Chronic bilateral low back pain with bilateral sciatica   Evaluation Date: 10/30/2024  Authorization Period Expiration: 10/01/2025  Plan of Care Expiration: 2024  Progress Note Due: 2024  Date of Surgery: N/A  Visit #/Visits authorized:      PTA Visit #: 0/5     Time In: 705 AM  Time Out: 805 AM  Total Billable Time: 60 minutes  INSURANCE and OUTCOMES: Fee for Service with FOTO Outcomes 1/3    Precautions: standard    Pattern of pain determined: undetermined, likely 1 PEP     Subjective   Gabriel Salvador reports no change in symptoms. She has been having back pain for many years. She has been treated by ortho therapist with minimal relief. She does get temporary relief with repeated standing extensions. She notices pain with bending, standing for long periods, and sitting for long periods of time. She is unable to be active due to her pain.      Patient reports tolerating previous visit first HB visit   Patient reports their pain to be 5/10 on a 0-10 scale with 0 being no pain and 10 being the worst pain imaginable.  Pain Location: across low back      Occupation:      Patients goals: Patient would like to be able to perform her jobs responsibilities safely     Objective     Baseline IM Testing Results:   Date of testin2024    ROM 0-54 deg   Max Peak Torque 85    Min Peak Torque 48    Flex/Ext Ratio 1.7   % below normative data 51%     Outcomes:    FOTO 2024: 48/100     Treatment    Gabriel received the treatments listed below:      Gabriel received  neuromuscular education for 23 minutes to improve balance, coordination, proprioception, motor control and/or posture    via participation on the Lumatix Machine. Therapist assisted patient in isolating and engaging spinal stabilization musculature in order to improve functional ability and postural control. Patient performed exercise with therapist guidance in order to accurately use pacer function, avoid valsalva, and optimally exert effort within a safe and effective range via the [place] Exertion Rating Scale. Patient instructed to perform at a midrange of exertion and to complete 15-20 repetitions within appropriate split time, with proper technique, and while maintaining safety.         12/24/2024     8:12 AM   [facility]   Visit Number 1   VAS Pain Rating 5   Lumbar Extension Seat Pad 0   Femur Restraint 6   Top Dead Center 24   Counterweight 160   Lumbar Flexion 54   Lumbar Extension 0   Lumbar Peak Torque 85 ft. lbs.   Min Torque 48   Test Percent Below Normative Data 51 %     Bridging 20 repetitions     Gabriel participated in therapeutic exercises to develop strength, endurance, ROM, flexibility, posture, and core stabilization for 14 minutes including:    Treadmill x 6 minutes at 2.0 mph gathering subjective     Torso only   Peripheral muscle strengthening which included one set of 15-20 repetitions at a slow and controlled 10-13 second per rep pace focused on strengthening supporting musculature in order to improve body mechanics and functional mobility.  Patient and therapist focused on proper form during treatment to ensure optimal strengthening of each targeted muscle group.  Machines utilized include torso rotation, chest press, triceps extension, bicep curl, and upright row. Leg extension, leg curl, leg press, and hip adduction/abduction added visit 3.    Gabriel participated in dynamic functional therapeutic activities to improve functional performance and simulate household and  community activities for 23 minutes. The following activities were included:    Baselines: pain with standing extensions, standing flexion, passive B hip ER/IR, passive R LE SLR     Prone press up 1 x 10, no effect on most baselines, slightly better with passive L hip IR/ER    Prone press up sag 1 x 10, no effect, increased standing extension range of motion noted     Educated on use of lumbar roll and repeated movements     manual therapy techniques: Joint mobilizations were applied to the  for  minutes, including:    Pt given cold pack for  minutes to  in .    Home Exercises Provided and Patient Education Provided    Home exercises include:   Prone pres up sag     Cardio program (V5): -  Lifting education (V11): -  Posture/ Using Lumbar Roll: educated   [facility] Discharge handout (date given): -    Equipment at home/gym membership: home gym - 2x/wk, treadmill she is doing it for about 20 minutes     Education provided:   - PT role and POC  - HEP    Written Home Exercises Provided: yes.  Exercises were reviewed and Gabriel was able to demonstrate them prior to the end of the session. Gabriel demonstrated good  understanding of the education provided.     See EMR under Patient Instructions for exercises provided 12/24/2024.    Assessment     Arrives to first healthy back tx session today. She is being transferred from ortho therapist to the HB program. She arrives with reports of continuation of low back pain. She does report some temporary relief with standing repeated extensions but short term relief. Repeated movement assessment performed with no effect following repeated prone press ups. 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 HEP included repeated extension in prone to assess response to extensions in a different position and educated patient to perform them more often. Physical Therapist also educated patient on use of lumbar roll to improve  sitting posture and tolerance. Medx testing performed today demonstrating decreased range of motion and strength based on age norms. She will benefit from skilled Physical Therapist service and HB to address back pain and improve functional mobility.     Patient is making good progress towards established goals.  Pt will continue to benefit from skilled outpatient physical therapy to address the deficits stated in the impairment chart, provide pt/family education and to maximize pt's level of independence in the home and community environment.     Anticipated Barriers for therapy: chronic history of back pain   Pt's spiritual, cultural and educational needs considered and pt agreeable to plan of care and goals as stated below:     Goals:  GOALS: Pt is in agreement with the following goals.    Short term goals:  6 weeks or 10 visits   - Pt will demonstrate increased lumbar MedX ROM by at least 3 degrees from the initial ROM value with improvements noted in functional ROM and ability to perform ADLs. Appropriate and Ongoing  - Pt will demonstrate increased MedX average isometric strength value by 15% from initial test resulting in improved ability to perform bending, lifting, and carrying activities safely, confidently. Appropriate and Ongoing  - Pt will report a reduction in worst pain score by 1-2 points for improved tolerance for sitting and bending. Appropriate and Ongoing  - Pt able to perform HEP correctly with minimal cueing or supervision from therapist to encourage independent management of symptoms. Appropriate and Ongoing    Long term goals: 10 weeks or 20 visits   - Pt will demonstrate increased lumbar MedX ROM by at least 6 degrees from initial ROM value, resulting in improved ability to perform functional forward bending while standing and sitting. Appropriate and Ongoing  - Pt will demonstrate increased MedX average isometric strength value by 30% from initial test resulting in improved ability to  perform bending, lifting, and carrying activities safely and confidently. Appropriate and Ongoing  - Pt to demonstrate ability to independently control and reduce their pain through posture positioning and mechanical movements throughout a typical day. Appropriate and Ongoing  - Pt will demonstrate reduced pain and improved functional outcomes as reported on the FOTO by reaching an intake score of >/= 61% functional ability in order to demonstrate subjective improvement in patient's condition. . Appropriate and Ongoing  - Pt will demonstrate independence with the HEP at discharge. Appropriate and Ongoing  - Patient will report >/= 50% improvement since evaluation to improve functional mobility and quality of life Appropriate and Ongoing    Plan   Continue with established Plan of Care towards established PT goals.     Therapist: Neris Babcock, PT  12/24/2024

## 2024-12-24 NOTE — PLAN OF CARE
[facility]  Physical Therapy Plan of Care Note     Name: Gabriel Salvador  Clinic Number: [id number]    Therapy Diagnosis:   Encounter Diagnoses   Name Primary?    Decreased strength of lower extremity Yes    Lumbar pain      Physician: Garth Jang MD    Visit Date: 2024    Physician Orders: PT Eval and Treat   Medical Diagnosis from Referral: M54.42,M54.41,G89.29 (ICD-10-CM) - Chronic bilateral low back pain with bilateral sciatica   Evaluation Date: 10/30/2024  Authorization Period Expiration: 10/01/2025  Plan of Care Expiration: 2024  Progress Note Due: 2024  Date of Surgery: N/A  Visit #/Visits authorized:      Precautions: Standard  Functional Level Prior to Evaluation:  independent     SUBJECTIVE     Update: Gabriel Salvador reports no change in symptoms. She has been having back pain for many years. She has been treated by ortho therapist with minimal relief. She does get temporary relief with repeated standing extensions. She notices pain with bending, standing for long periods, and sitting for long periods of time. She is unable to be active due to her pain.  She has been trying to walk on the treadmill twice a week for 20 minutes. 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     OBJECTIVE     Update:     Baseline IM Testing Results:   Date of testin2024     ROM 0-54 deg   Max Peak Torque 85    Min Peak Torque 48    Flex/Ext Ratio 1.7   % below normative data 51%      Outcomes:     FOTO 2024: 48/100     ASSESSMENT     Update:       Arrives to first healthy back tx session today. She is being transferred from ortho therapist to the HB program. She arrives with reports of continuation of low back pain. She does report some temporary relief with standing repeated extensions but short term relief.  Repeated movement assessment performed with no effect following repeated prone press ups. 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 HEP included repeated extension in prone to assess response to extensions in a different position and educated patient to perform them more often. Physical Therapist also educated patient on use of lumbar roll to improve sitting posture and tolerance. Medx testing performed today demonstrating decreased range of motion and strength based on age norms. She will benefit from skilled Physical Therapist service and HB to address back pain and improve functional mobility.        Previous Short Term Goals Status:   progressing   New Short Term Goals Status:   progressing   Long Term Goal Status: modified:  updated for HB program transition   Reasons for Recertification of Therapy:   transition into the HB program, decreased lumbar range of motion and strength, back pain, decreased functional mobility and activity tolerance     GOALS      Short term goals:  6 weeks or 10 visits   - Pt will demonstrate increased lumbar MedX ROM by at least 3 degrees from the initial ROM value with improvements noted in functional ROM and ability to perform ADLs. Appropriate and Ongoing  - Pt will demonstrate increased MedX average isometric strength value by 15% from initial test resulting in improved ability to perform bending, lifting, and carrying activities safely, confidently. Appropriate and Ongoing  - Pt will report a reduction in worst pain score by 1-2 points for improved tolerance for sitting and bending. Appropriate and Ongoing  - Pt able to perform HEP correctly with minimal cueing or supervision from therapist to encourage independent management of symptoms. Appropriate and Ongoing     Long term goals: 10 weeks or 20 visits   - Pt will demonstrate increased lumbar MedX ROM by at least 6 degrees from initial ROM value, resulting in improved  ability to perform functional forward bending while standing and sitting. Appropriate and Ongoing  - Pt will demonstrate increased MedX average isometric strength value by 30% from initial test resulting in improved ability to perform bending, lifting, and carrying activities safely and confidently. Appropriate and Ongoing  - Pt to demonstrate ability to independently control and reduce their pain through posture positioning and mechanical movements throughout a typical day. Appropriate and Ongoing  - Pt will demonstrate reduced pain and improved functional outcomes as reported on the FOTO by reaching an intake score of >/= 61% functional ability in order to demonstrate subjective improvement in patient's condition. . Appropriate and Ongoing  - Pt will demonstrate independence with the HEP at discharge. Appropriate and Ongoing  - Patient will report >/= 50% improvement since evaluation to improve functional mobility and quality of life Appropriate and Ongoing    PLAN     Updated Certification Period: 12/24/2024 to 3/24/2025   Recommended Treatment Plan: 2 times per week for 10 weeks:  Gait Training, Manual Therapy, Moist Heat/ Ice, Neuromuscular Re-ed, Patient Education, Therapeutic Activities, and Therapeutic Exercise  Other Recommendations: none     Neris Babcock, PT

## 2024-12-26 ENCOUNTER — OFFICE VISIT (OUTPATIENT)
Dept: ORTHOPEDICS | Facility: CLINIC | Age: 39
End: 2024-12-26
Payer: COMMERCIAL

## 2024-12-26 VITALS — BODY MASS INDEX: 32.37 KG/M2 | RESPIRATION RATE: 18 BRPM | HEIGHT: 64 IN | WEIGHT: 189.63 LBS

## 2024-12-26 DIAGNOSIS — M17.0 PRIMARY OSTEOARTHRITIS OF KNEES, BILATERAL: Primary | ICD-10-CM

## 2024-12-26 DIAGNOSIS — M22.40 CHONDROMALACIA OF PATELLA, UNSPECIFIED LATERALITY: ICD-10-CM

## 2024-12-26 PROCEDURE — 99999 PR PBB SHADOW E&M-EST. PATIENT-LVL III: CPT | Mod: PBBFAC,,, | Performed by: ORTHOPAEDIC SURGERY

## 2024-12-26 PROCEDURE — 3044F HG A1C LEVEL LT 7.0%: CPT | Mod: CPTII,S$GLB,, | Performed by: ORTHOPAEDIC SURGERY

## 2024-12-26 PROCEDURE — 99214 OFFICE O/P EST MOD 30 MIN: CPT | Mod: S$GLB,,, | Performed by: ORTHOPAEDIC SURGERY

## 2024-12-26 PROCEDURE — 3008F BODY MASS INDEX DOCD: CPT | Mod: CPTII,S$GLB,, | Performed by: ORTHOPAEDIC SURGERY

## 2024-12-26 PROCEDURE — 1159F MED LIST DOCD IN RCRD: CPT | Mod: CPTII,S$GLB,, | Performed by: ORTHOPAEDIC SURGERY

## 2024-12-26 NOTE — PROGRESS NOTES
Past Medical History:   Diagnosis Date    Abnormal Pap smear of cervix 2006    Asthma     Breast disorder        Past Surgical History:   Procedure Laterality Date    BREAST BIOPSY Left 2011    COLPOSCOPY      laproscopy          Current Outpatient Medications   Medication Sig    albuterol (PROVENTIL/VENTOLIN HFA) 90 mcg/actuation inhaler Inhale 2 puffs into the lungs every 6 (six) hours as needed for Wheezing.    B.coagul,subtilis/inulin/vit C (CULTURELLE PROBIOTIC-PREBIOTIC ORAL)     betamethasone dipropionate 0.05 % cream Apply topically 2 (two) times daily. Use with Minoxidil    cetirizine (ZYRTEC) 10 MG tablet Take 1 tablet (10 mg total) by mouth once daily.    DULoxetine (CYMBALTA) 60 MG capsule Take 1 capsule (60 mg total) by mouth once daily.    fluticasone propionate (FLONASE) 50 mcg/actuation nasal spray 1 spray (50 mcg total) by Each Nostril route once daily.    loratadine 10 mg Cap     MELATONIN ORAL Take by mouth.    meloxicam (MOBIC) 15 MG tablet Take 1 tablet (15 mg total) by mouth once daily. For back pain/Knee pain, take with food. Do not take with Ibuprofen/Motrin/Aleve    minoxidiL (ROGAINE) 2 % external solution Apply topically 2 (two) times daily. For hair loss    multivitamin capsule Take 1 capsule by mouth once daily.     No current facility-administered medications for this visit.       Review of patient's allergies indicates:   Allergen Reactions    Pollen extracts Other (See Comments)       Family History   Problem Relation Name Age of Onset    Diabetes Father         Social History     Socioeconomic History    Marital status:    Tobacco Use    Smoking status: Never    Smokeless tobacco: Never   Substance and Sexual Activity    Alcohol use: No    Drug use: No    Sexual activity: Yes     Partners: Male     Birth control/protection: None     Social Drivers of Health     Financial Resource Strain: Low Risk  (8/19/2024)    Overall Financial Resource Strain (CARDIA)     Difficulty of  Paying Living Expenses: Not hard at all   Food Insecurity: No Food Insecurity (8/19/2024)    Hunger Vital Sign     Worried About Running Out of Food in the Last Year: Never true     Ran Out of Food in the Last Year: Never true   Physical Activity: Unknown (8/19/2024)    Exercise Vital Sign     Days of Exercise per Week: 5 days   Stress: Stress Concern Present (8/19/2024)    [employer] - Occupational Stress Questionnaire     Feeling of Stress : Very much   Housing Stability: Unknown (8/19/2024)    Housing Stability Vital Sign     Unable to Pay for Housing in the Last Year: No       Chief Complaint:   No chief complaint on file.      History of present illness:  39-year-old female seen for bilateral knee pain.  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  Pain started years ago.  Knee feels unstable.  Patient hears popping and rubbing.  Pain along the front of her knee.  She has tried over-the-counter braces which do not help.    Had an MRI done recently which just show some early chondromalacia of the patella.  Ordered some physical therapy at her last visit.  Has not really helped a whole lot.  We have done cortisone injections on her previously.  We to get viscosupplementation approved but her insurance does not cover it.        Review of Systems:  Musculoskeletal:  See HPI        Physical Examination:    Vital Signs:  There were no vitals filed for this visit.    There is no height or weight on file to calculate BMI.    This a well-developed, well nourished patient in no acute distress.  They are alert and oriented and cooperative to examination.  Pt. walks without an antalgic gait.      Examination of bilateral knees shows no rashes or erythema. There are no masses ecchymosis or effusion. Patient has full range of motion from 0-130°. Patient is nontender to palpation over lateral joint line and nontender to palpation over the medial joint line.  Knee is stable to varus and  valgus stress. 5 out of 5 motor strength. Palpable distal pulses. Intact light touch sensation. Negative Patellofemoral crepitus.  Mild lateral tilt      X-rays:  X-rays of the right knee is reviewed which shows mild lateral tilt of the patella    MRI of the left knee is reviewed and interpreted:1. There is trace fluid superficial to the patellar tendon likely reflecting mild peritendinitis.  2. Mild sprain of the MCL.  3. Focal near full thickness fissure of the medial patellar facet articular cartilage with adjacent subchondral cystic degeneration of the patella.    MRI of the right knee is reviewed and interpreted:1. Mild chondromalacia with the medial patellar facet  2. Trace amount of fluid at the suprapatellar recess.  3. Degenerative geode formation posteriorly at the medial tibial plateau.     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  She is about to switch to [facility] so we should have a better chance of getting the viscosupplementation approved.  Once we get the referral from [facility] we will put the authorization and for bilateral hyaluronic acid    The patient has tried a self guided exercise program that has included walking without significant improvement. Minimal relief with tylenol or OTC Nsaids. Reports less than 8 weeks relief with IA steroid injection. Kellgren Acosta scale of 2. They are not receiving another HA injectable at this time. I will precert for gel injection.               All previous pertinent notes including ER visits, physical therapy visits, other orthopedic visits as well as other care for the same musculoskeletal problem were reviewed.  All pertinent lab values and previous imaging was reviewed pertinent to the current visit.    This note was created using [employer] voice recognition software that occasionally misinterpreted phrases or words.    Consult note is delivered via Epic messaging  service.

## 2024-12-27 ENCOUNTER — CLINICAL SUPPORT (OUTPATIENT)
Dept: BEHAVIORAL HEALTH | Facility: CLINIC | Age: 39
End: 2024-12-27
Payer: COMMERCIAL

## 2024-12-27 DIAGNOSIS — F41.1 GAD (GENERALIZED ANXIETY DISORDER): Primary | ICD-10-CM

## 2024-12-27 NOTE — PROGRESS NOTES
"Primary Care Behavioral Health Integration: Follow-up  Date:  12/27/2024  Patient Name: Gabriel Salvador  Type of Visit:  Video Session  Site:  [facility]  Referral Source:  Bonnie Murillo DO    Visit type: Virtual visit with synchronous audio and video  The patient location is:  Home  The patient location [place] is: [facility]  The patient phone number is: [phone]    Each patient to whom he or she provides medical services by telemedicine is:  (1) informed of the relationship between the physician and patient and the respective role of any other health care provider with respect to management of the patient; and (2) notified that he or she may decline to receive medical services by telemedicine and may withdraw from such care at any time.    History of Present Illness:  Gabriel Salvador, a 39 y.o.  female with history of Anxiety disorders; generalized anxiety disorder [F41.1] referred by Bonnie Murillo DO.  Patient was seen, examined and chart was reviewed.    Met with patient.     Chief complaint/reason for encounter: anxiety     Current session:  Patient reported having a panic attack over added responsibilities at work. 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 Pt had a major panic attack and had to go into a closet to calm down. Feels like a "coward". Discussed being human and having to take care of herself first. Anxiety began when she was in the . 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 Some of the men would comment on the women and how they looked instead of how they worked. Had to put herself on a high standard which added to anxiety and stress. Developed health issues and they treated her like it was mental. Got  while deployed. Ended up getting  because of infidelity. Has re- to a " great man in May 2024. Sleep is still bad with only getting 4 hours a night and being hypervigilant. This started in the . After 2 weeks she's exhausted and has to take a day to sleep 8-9 hours and then is good again.    Risk Assessment    [place] Suicide Severity Rating Scale  Have you wished you were dead or wished you could go to sleep and not wake up? No  2.   Have you actually had any thoughts of killing yourself? No        (If yes to 2, ask questions 3-6. If No go directly to 6)  3.   Have you been thinking about how you might do this? No  4.   Have you had these thoughts and had some intention of acting on them? No  5.   Have you started to work out or worked out the details of how to kill yourself? No Do you intend to carry out this plan? No  6. Have you ever done anything, started to do anything, or prepared to do anything to end your life? No   If yes: Were any of these in the past 3 months? No    Treatment plan:  Target symptoms: anxiety   Why chosen therapy is appropriate versus another modality: relevant to diagnosis  Outcome monitoring methods: self-report, observation  Therapeutic intervention type: supportive psychotherapy      Risk parameters:  Patient reports no suicidal ideation  Patient reports no homicidal ideation  Patient reports no self-injurious behavior  Patient reports no violent behavior    Verbal deficits: None    Patient's response to intervention:  The patient's response to intervention is accepting.    Progress toward goals and other mental status changes:  The patient's progress toward goals is limited.    Patient advised to call [phone] or present the the nearest ED if they experience suicidal or homicidal ideation, plan or intent.           No data to display                     No data to display                Diagnosis:     ICD-10-CM ICD-9-CM   1. CAMILLA (generalized anxiety disorder)  F41.1 300.02       Plan: Pt plans to continue individual therapy.    Return to clinic:  as needed    Length of Service (minutes): 60    Non face-to-face clinical time (minutes): 15

## 2025-01-06 ENCOUNTER — TELEPHONE (OUTPATIENT)
Dept: FAMILY MEDICINE | Facility: CLINIC | Age: 40
End: 2025-01-06
Payer: COMMERCIAL

## 2025-01-06 NOTE — TELEPHONE ENCOUNTER
----- Message from Deanne sent at 1/6/2025 11:10 AM CST -----   Type:  Needs Medical Advice    Who Called: PT    Would the patient rather a call back or a response via [facility]? Call  Best Call Back Number: [phone]        Additional Information: pt states appt at 12:30pm on today 1/6/25 hasa question before coming to appt to determent if she needs to come. Please call back to advise. Thank you!

## 2025-02-17 ENCOUNTER — OFFICE VISIT (OUTPATIENT)
Dept: FAMILY MEDICINE | Facility: CLINIC | Age: 40
End: 2025-02-17
Payer: OTHER GOVERNMENT

## 2025-02-17 DIAGNOSIS — M54.50 LUMBAR PAIN: ICD-10-CM

## 2025-02-17 DIAGNOSIS — R10.9 ABDOMINAL CRAMPS: ICD-10-CM

## 2025-02-17 DIAGNOSIS — F41.1 GAD (GENERALIZED ANXIETY DISORDER): Primary | ICD-10-CM

## 2025-02-17 DIAGNOSIS — M94.269 CHONDROMALACIA OF KNEE, UNSPECIFIED LATERALITY: ICD-10-CM

## 2025-02-17 RX ORDER — DICYCLOMINE HYDROCHLORIDE 10 MG/1
10 CAPSULE ORAL 3 TIMES DAILY PRN
Qty: 120 CAPSULE | Refills: 0 | Status: SHIPPED | OUTPATIENT
Start: 2025-02-17 | End: 2025-03-19

## 2025-02-17 NOTE — Clinical Note
Follow-up in 3 months (Jany) Please assist patient with referral to ortho back and spine as well as ortho for her knees

## 2025-02-17 NOTE — PROGRESS NOTES
[facility]  The patient location is:  Patient Home [place]  The chief complaint leading to consultation is:   Chief Complaint   Patient presents with    Anxiety    Back Pain    Knee Pain     Total time spent with patient: 20 min     Visit type: Virtual visit with synchronous audio and video  Each patient to whom he or she provides medical services by telemedicine is:  (1) informed of the relationship between the physician and patient and the respective role of any other health care provider with respect to management of the patient; and (2) notified that he or she may decline to receive medical services by telemedicine and may withdraw from such care at any time.    This service was not originating from a related E/M service provided within the previous 7 days nor will  to an E/M service or procedure within the next 24 hours or my soonest available appointment.  Prevailing standard of care was able to be met in this synchronous audio and video visit    Subjective:    Chief Complaint:   Chief Complaint   Patient presents with    Anxiety    Back Pain    Knee Pain     274}  HPI:  40 y.o. female with CAMILLA , chronic knee and back pain presents for follow-up visit.  She has been seen by Orthopedics and recommended for injection therapy for chondromalacia however was previously unable to continue.  She would like to referred again back to Orthopedics with her new insurance.    She also reports continued ongoing back pain and knee pain for which she will address further with Orthopedics.    She mentions abdominal pain and history of uterine fibroids for which she has plans to follow up with gyn. She would like to consider the medication alleviation pain      The HPI and pertinent ROS is included in the Diagnostic Impression Remarks section at the end of the note. Please see below for further details.     The following portions of the patient's history were reviewed and updated as  "appropriate: allergies, current medications, past family history, past medical history, past social history, past surgical history and problem list.  Past Medical History:   Diagnosis Date    Abnormal Pap smear of cervix 2006    Asthma     Breast disorder      Past Surgical History:   Procedure Laterality Date    BREAST BIOPSY Left 2011    COLPOSCOPY      laproscopy        Social History  Social History[1]  Family History   Problem Relation Name Age of Onset    Diabetes Father       Review of patient's allergies indicates:   Allergen Reactions    Pollen extracts Other (See Comments)     274}  Physical Examination  There were no vitals taken for this visit.  Wt Readings from Last 3 Encounters:   12/26/24 86 kg (189 lb 9.5 oz)   11/04/24 86 kg (189 lb 9.5 oz)   10/14/24 86 kg (189 lb 9.5 oz)     BP Readings from Last 3 Encounters:   10/11/24 126/72   09/27/24 102/68   08/30/24 110/70     Estimated body mass index is 32.54 kg/m² as calculated from the following:    Height as of 12/26/24: 5' 4" (1.626 m).    Weight as of 12/26/24: 86 kg (189 lb 9.5 oz).       CONSTITUTIONAL: No apparent distress. Does not appear acutely ill or septic. Appears adequately hydrated.  PULM: Breathing unlabored.  PSYCHIATRIC: Alert and conversant and grossly oriented. Mood is grossly neutral. Affect appropriate. Judgment and insight grossly intact.  Integument: normal coloration and turgor, no rashes, no suspicious skin lesions noted.    Data reviewed  Previous medical records reviewed and summarized in HPI.   274}    Laboratory  I have reviewed old labs below:  Lab Results   Component Value Date    WBC 4.22 08/30/2024    HGB 13.1 08/30/2024    HCT 41.7 08/30/2024    MCV 91 08/30/2024     08/30/2024     08/30/2024    K 4.2 08/30/2024     08/30/2024    CALCIUM 9.6 08/30/2024    CO2 26 08/30/2024    GLU 88 08/30/2024    BUN 13 08/30/2024    CREATININE 1.0 08/30/2024    ANIONGAP 8 08/30/2024    ESTGFRAFRICA >60 08/23/2019    " "EGFRNONAA >60 08/23/2019    PROT 7.8 08/30/2024    ALBUMIN 4.2 08/30/2024    BILITOT 0.5 08/30/2024    ALKPHOS 66 08/30/2024    ALT 10 08/30/2024    AST 20 08/30/2024    CHOL 159 08/30/2024    TRIG 52 08/30/2024    HDL 51 08/30/2024    LDLCALC 97.6 08/30/2024    TSH 0.878 08/30/2024    HGBA1C 5.2 08/30/2024     Lab reviewed by me: Particular labs of significance that I will monitor, workup, or treat to improve are mentioned below in diagnostic impression remarks.  274}  Imaging/EKG: I have reviewed the pertinent results/findings and my personal findings are noted below in diagnostic impression remarks.     Assessment/Plan  Gabriel Salvador is a 40 y.o. female who presents to clinic with:    1. CAMILLA (generalized anxiety disorder)    2. Lumbar pain    3. Chondromalacia of knee, unspecified laterality    4. Abdominal cramps         Diagnostic Impression Remarks + HPI     Documentation entered by me for this encounter may have been done in part using speech-recognition technology. Although I have made an effort to ensure accuracy, "sound like" errors may exist and should be interpreted in context.           This is the extent of the patient's complaints at this present time. She denies chest pain upon exertion, dyspnea, nausea, vomiting, diaphoresis, and syncope. No pleuritic chest pain, unilateral leg swelling, calf tenderness, or calf pain.     Gabriel will return to clinic in a few months for further workup and reassessment or sooner as needed. She was instructed to call the clinic or go to the emergency department if her symptoms do not improve, worsens, or if new symptoms develop. As we discussed that symptoms could worsen over the next 24 hours she was advised that if any increased swelling, pain, or numbness arise to go immediately to the ED. Patient knows to call any time if an emergency arises. Shared decision making occurred and she verbalized understanding in agreement with this plan. " "  274}  BMI Goal    Counseled patient on her ideal body weight, health consequences of being obese and current recommendations including weekly exercise and a heart healthy diet.  She is aware that ideal BMI < 25  Estimated body mass index is 32.54 kg/m² as calculated from the following:    Height as of 12/26/24: 5' 4" (1.626 m).    Weight as of 12/26/24: 86 kg (189 lb 9.5 oz).     She was counseled about the importance of healthy dietary habits as well as routine physical activity and exercise for better health outcomes. I also discussed the importance of cancer screening.     Medication Monitoring    In today's visit, monitoring for drug toxicity was accomplished. Proper use of medications was also discussed.     Counseling    Eat Less Unhealthy Fat   -Cut back on saturated fats and trans (also called hydrogenated) fats. A diet thats high in these fats increases your bad cholesterol. Its not enough to just cut back on foods containing cholesterol.   -Eat about 2 servings of fish per week. Most fish contain omega-3 fatty acids. These help lower blood cholesterol.   -Eat more whole grains and soluble fiber (such as oat bran). These lower overall cholesterol.   -Counseled that most cholesterol is made in the liver and only a minority comes from diet.    Be Active   -Choose an activity you enjoy. Walking, swimming, and riding a bike are some good ways to be active.   -Start at a level where you feel comfortable. Increase your time and pace a little each week.   -Work up to 30 minutes on most days. You can break this up into three 10-minute periods.   -Remember, some activity is better than none.   -If you havent been exercising regularly, start slowly. Check with your doctor to make sure the exercise plan is right for you.     Take Medication As Directed: Many patients need medication to get their LDL levels to a safe level. Medication to lower cholesterol levels is effective and safe. (But taking medication is not " a substitute for exercise or watching your diet!).    I discussed imaging findings, diagnosis, possibilities, treatment options, medications, risks, and benefits. She had many questions regarding the options and long-term effects. All questions were answered. She expressed understanding after counseling regarding the diagnosis and recommendations. She was capable and demonstrated competence with understanding of these options. Shared decision making was performed resulting in her choosing the current treatment plan.     I also discussed the importance of close follow up to discuss labs, change or modify her medications if needed, monitor side effects, and further evaluation of medical problems.     Additional workup planned: see labs ordered below.    CAMILLA (generalized anxiety disorder)  Comments:  No longer taking Cymbalta did not know much improvement.  Referral to behavioral health.  Defer therapy until seen  Orders:  -     Ambulatory referral/consult to Psychiatry; Future; Expected date: 02/17/2025    Lumbar pain  Comments:  New referral placed to ortho under new insurance  Orders:  -     Ambulatory referral/consult to [facility]; Future; Expected date: 02/18/2025    Chondromalacia of knee, unspecified laterality  Comments:  New referral placed to ortho under new insurance  Orders:  -     Ambulatory referral/consult to Orthopedics; Future; Expected date: 02/17/2025    Abdominal cramps  Comments:  Recommended follow up with gyn for possible uterine fibroids, start trial of Bentyl p.r.n.  Orders:  -     dicyclomine (BENTYL) 10 MG capsule; Take 1 capsule (10 mg total) by mouth 3 (three) times daily as needed (Abdominal Cramping).  Dispense: 120 capsule; Refill: 0             Medication List with Changes/Refills   New Medications    DICYCLOMINE (BENTYL) 10 MG CAPSULE    Take 1 capsule (10 mg total) by mouth 3 (three) times daily as needed (Abdominal Cramping).   Current Medications    ALBUTEROL  "(PROVENTIL/VENTOLIN HFA) 90 MCG/ACTUATION INHALER    Inhale 2 puffs into the lungs every 6 (six) hours as needed for Wheezing.    B.COAGUL,SUBTILIS/INULIN/VIT C (CULTURELLE PROBIOTIC-PREBIOTIC ORAL)        BETAMETHASONE DIPROPIONATE 0.05 % CREAM    Apply topically 2 (two) times daily. Use with Minoxidil    CETIRIZINE (ZYRTEC) 10 MG TABLET    Take 1 tablet (10 mg total) by mouth once daily.    DULOXETINE (CYMBALTA) 60 MG CAPSULE    Take 1 capsule (60 mg total) by mouth once daily.    FLUTICASONE PROPIONATE (FLONASE) 50 MCG/ACTUATION NASAL SPRAY    1 spray (50 mcg total) by Each Nostril route once daily.    LORATADINE 10 MG CAP        MELATONIN ORAL    Take by mouth.    MULTIVITAMIN CAPSULE    Take 1 capsule by mouth once daily.     Modified Medications    No medications on file       Sally Murillo DO  02/17/2025     Documentation entered by me for this encounter may have been done in part using speech-recognition technology. Although I have made an effort to ensure accuracy, "sound like" errors may exist and should be interpreted in context.  I spent a total of 24 minutes on the day of the visit.             [1]   Social History  Tobacco Use    Smoking status: Never    Smokeless tobacco: Never   Substance Use Topics    Alcohol use: No    Drug use: No     "

## 2025-02-17 NOTE — PATIENT INSTRUCTIONS
Louie Rios,     If you are due for any health screening(s) below please notify me so we can arrange them to be ordered and scheduled to maintain your health. Most healthy patients complete it. Don't lose out on improving your health.     Tests to Keep You Healthy    Mammogram: Met on 9/30/2024  Cervical Cancer Screening: DUE              Dear MsChris RadhaPollyRichar:    Your [facility] Team is dedicated to helping you stay healthy with regularly scheduled recommended screenings.  Scheduling routine screenings is important to maintaining good health. Our records indicate that you may be overdue for your screening pap smear. A pap smear screening can help identify patients at risk for developing cervical cancer at an early stage, when it is most likely to be successfully treated.    We encourage you to schedule your appointment with your womens health provider or some primary care providers also perform this screening.    If you have completed or scheduled your pap smear screening outside of [facility], please notify your primary care team so we can update your health record.      If you have questions or would like to schedule your screening, please contact your primary care clinic.    Sincerely,    Your [facility]

## 2025-02-20 DIAGNOSIS — Z12.4 ENCOUNTER FOR SCREENING FOR CERVICAL CANCER: Primary | ICD-10-CM

## 2025-02-21 DIAGNOSIS — R92.8 ABNORMAL MAMMOGRAM: Primary | ICD-10-CM

## 2025-02-21 DIAGNOSIS — M54.9 DORSALGIA, UNSPECIFIED: ICD-10-CM

## 2025-02-25 DIAGNOSIS — M17.0 PRIMARY OSTEOARTHRITIS OF KNEES, BILATERAL: Primary | ICD-10-CM

## 2025-03-05 ENCOUNTER — PATIENT MESSAGE (OUTPATIENT)
Dept: FAMILY MEDICINE | Facility: CLINIC | Age: 40
End: 2025-03-05
Payer: OTHER GOVERNMENT

## 2025-03-06 ENCOUNTER — OFFICE VISIT (OUTPATIENT)
Dept: ORTHOPEDICS | Facility: CLINIC | Age: 40
End: 2025-03-06
Payer: OTHER GOVERNMENT

## 2025-03-06 DIAGNOSIS — M17.0 PRIMARY OSTEOARTHRITIS OF KNEES, BILATERAL: Primary | ICD-10-CM

## 2025-03-06 PROCEDURE — 99499 UNLISTED E&M SERVICE: CPT | Mod: 25,S$PBB,, | Performed by: ORTHOPAEDIC SURGERY

## 2025-03-06 PROCEDURE — 99999 PR PBB SHADOW E&M-EST. PATIENT-LVL II: CPT | Mod: PBBFAC,,, | Performed by: ORTHOPAEDIC SURGERY

## 2025-03-06 PROCEDURE — 99212 OFFICE O/P EST SF 10 MIN: CPT | Mod: PBBFAC,PO,25 | Performed by: ORTHOPAEDIC SURGERY

## 2025-03-06 PROCEDURE — 20610 DRAIN/INJ JOINT/BURSA W/O US: CPT | Mod: 50,PBBFAC,PO | Performed by: ORTHOPAEDIC SURGERY

## 2025-03-06 PROCEDURE — 99999PBSHW PR PBB SHADOW TECHNICAL ONLY FILED TO HB: Mod: JZ,PBBFAC,,

## 2025-03-06 RX ADMIN — Medication 20 MG: at 03:03

## 2025-03-06 NOTE — PROGRESS NOTES
Past Medical History:   Diagnosis Date    Abnormal Pap smear of cervix 2006    Asthma     Breast disorder        Past Surgical History:   Procedure Laterality Date    BREAST BIOPSY Left 2011    COLPOSCOPY      laproscopy          Current Outpatient Medications   Medication Sig    albuterol (PROVENTIL/VENTOLIN HFA) 90 mcg/actuation inhaler Inhale 2 puffs into the lungs every 6 (six) hours as needed for Wheezing.    B.coagul,subtilis/inulin/vit C (CULTURELLE PROBIOTIC-PREBIOTIC ORAL)     betamethasone dipropionate 0.05 % cream Apply topically 2 (two) times daily. Use with Minoxidil    cetirizine (ZYRTEC) 10 MG tablet Take 1 tablet (10 mg total) by mouth once daily.    dicyclomine (BENTYL) 10 MG capsule Take 1 capsule (10 mg total) by mouth 3 (three) times daily as needed (Abdominal Cramping).    DULoxetine (CYMBALTA) 60 MG capsule Take 1 capsule (60 mg total) by mouth once daily.    fluticasone propionate (FLONASE) 50 mcg/actuation nasal spray 1 spray (50 mcg total) by Each Nostril route once daily.    loratadine 10 mg Cap     MELATONIN ORAL Take by mouth.    multivitamin capsule Take 1 capsule by mouth once daily.     No current facility-administered medications for this visit.       Review of patient's allergies indicates:   Allergen Reactions    Pollen extracts Other (See Comments)       Family History   Problem Relation Name Age of Onset    Diabetes Father         Social History     Socioeconomic History    Marital status:    Tobacco Use    Smoking status: Never    Smokeless tobacco: Never   Substance and Sexual Activity    Alcohol use: No    Drug use: No    Sexual activity: Yes     Partners: Male     Birth control/protection: None     Social Drivers of Health     Financial Resource Strain: Low Risk  (2/17/2025)    Overall Financial Resource Strain (CARDIA)     Difficulty of Paying Living Expenses: Not hard at all   Food Insecurity: No Food Insecurity (2/17/2025)    Hunger Vital Sign     Worried About  Running Out of Food in the Last Year: Never true     Ran Out of Food in the Last Year: Never true   Transportation Needs: No Transportation Needs (2/17/2025)    PRAPARE - Transportation     Lack of Transportation (Medical): No     Lack of Transportation (Non-Medical): No   Physical Activity: Insufficiently Active (2/17/2025)    Exercise Vital Sign     Days of Exercise per Week: 3 days     Minutes of Exercise per Session: 30 min   Stress: Stress Concern Present (2/17/2025)    [employer] - Occupational Stress Questionnaire     Feeling of Stress : Very much   Housing Stability: Low Risk  (2/17/2025)    Housing Stability Vital Sign     Unable to Pay for Housing in the Last Year: No     Number of Times Moved in the Last Year: 0     Homeless in the Last Year: No       Chief Complaint:   No chief complaint on file.      History of present illness:  39-year-old female seen for bilateral knee pain.  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  Pain started years ago.  Knee feels unstable.  Patient hears popping and rubbing.  Pain along the front of her knee.  She has tried over-the-counter braces which do not help.    Had an MRI done recently which just show some early chondromalacia of the patella.  Ordered some physical therapy at her last visit.  Has not really helped a whole lot.  We have done cortisone injections on her previously.  We to get viscosupplementation approved but her insurance does not cover it.        Review of Systems:  Musculoskeletal:  See HPI        Physical Examination:    Vital Signs:  There were no vitals filed for this visit.    There is no height or weight on file to calculate BMI.    This a well-developed, well nourished patient in no acute distress.  They are alert and oriented and cooperative to examination.  Pt. walks without an antalgic gait.      Examination of bilateral knees shows no rashes or erythema. There are no masses ecchymosis or effusion.  Patient has full range of motion from 0-130°. Patient is nontender to palpation over lateral joint line and nontender to palpation over the medial joint line.  Knee is stable to varus and valgus stress. 5 out of 5 motor strength. Palpable distal pulses. Intact light touch sensation. Negative Patellofemoral crepitus.  Mild lateral tilt      X-rays:  X-rays of the right knee is reviewed which shows mild lateral tilt of the patella    MRI of the left knee is reviewed and interpreted:1. There is trace fluid superficial to the patellar tendon likely reflecting mild peritendinitis.  2. Mild sprain of the MCL.  3. Focal near full thickness fissure of the medial patellar facet articular cartilage with adjacent subchondral cystic degeneration of the patella.    MRI of the right knee is reviewed and interpreted:1. Mild chondromalacia with the medial patellar facet  2. Trace amount of fluid at the suprapatellar recess.  3. Degenerative geode formation posteriorly at the medial tibial plateau.     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  I injected both knees with Euflexxa one of 3.  Follow up next week.    The patient has tried a self guided exercise program that has included walking without significant improvement. Minimal relief with tylenol or OTC Nsaids. Reports less than 8 weeks relief with IA steroid injection. Kellgren Acosta scale of 2. They are not receiving another HA injectable at this time. I will precert for gel injection.               All previous pertinent notes including ER visits, physical therapy visits, other orthopedic visits as well as other care for the same musculoskeletal problem were reviewed.  All pertinent lab values and previous imaging was reviewed pertinent to the current visit.    This note was created using [employer] voice recognition software that occasionally misinterpreted phrases or words.    Consult  note is delivered via Epic messaging service.

## 2025-03-06 NOTE — PROCEDURES
Large Joint Aspiration/Injection: bilateral knee    Date/Time: 3/6/2025 3:15 PM    Performed by: Rachid Duarte MD  Authorized by: Rachid Duarte MD    Consent Done?:  Yes (Verbal)  Indications:  Pain  Site marked: the procedure site was marked    Timeout: prior to procedure the correct patient, procedure, and site was verified      Details:  Needle Size:  20 G  Approach:  Anterolateral  Location:  Knee  Laterality:  Bilateral  Site:  Bilateral knee  Medications (Right):  20 mg sodium hyaluronate (EUFLEXXA) 10 mg/mL(mw 2.4 -3.6 million)  Medications (Left):  20 mg sodium hyaluronate (EUFLEXXA) 10 mg/mL(mw 2.4 -3.6 million)  Patient tolerance:  Patient tolerated the procedure well with no immediate complications

## 2025-03-07 ENCOUNTER — HOSPITAL ENCOUNTER (OUTPATIENT)
Dept: RADIOLOGY | Facility: HOSPITAL | Age: 40
Discharge: HOME | End: 2025-03-07
Attending: PHYSICAL MEDICINE & REHABILITATION
Payer: OTHER GOVERNMENT

## 2025-03-07 DIAGNOSIS — M54.9 DORSALGIA, UNSPECIFIED: ICD-10-CM

## 2025-03-07 PROCEDURE — 72148 MRI LUMBAR SPINE W/O DYE: CPT | Mod: 26,,, | Performed by: RADIOLOGY

## 2025-03-07 PROCEDURE — 72148 MRI LUMBAR SPINE W/O DYE: CPT | Mod: TC

## 2025-03-12 ENCOUNTER — OFFICE VISIT (OUTPATIENT)
Dept: SPINE | Facility: CLINIC | Age: 40
End: 2025-03-12
Payer: OTHER GOVERNMENT

## 2025-03-12 ENCOUNTER — PATIENT MESSAGE (OUTPATIENT)
Dept: ADMINISTRATIVE | Facility: HOSPITAL | Age: 40
End: 2025-03-12
Payer: OTHER GOVERNMENT

## 2025-03-12 VITALS — HEIGHT: 64 IN | BODY MASS INDEX: 32.37 KG/M2 | WEIGHT: 189.63 LBS

## 2025-03-12 DIAGNOSIS — M54.42 CHRONIC BILATERAL LOW BACK PAIN WITH BILATERAL SCIATICA: Primary | ICD-10-CM

## 2025-03-12 DIAGNOSIS — M54.41 CHRONIC BILATERAL LOW BACK PAIN WITH BILATERAL SCIATICA: Primary | ICD-10-CM

## 2025-03-12 DIAGNOSIS — G89.29 CHRONIC BILATERAL LOW BACK PAIN WITH BILATERAL SCIATICA: Primary | ICD-10-CM

## 2025-03-12 PROCEDURE — 99213 OFFICE O/P EST LOW 20 MIN: CPT | Mod: PBBFAC,PN | Performed by: PHYSICAL MEDICINE & REHABILITATION

## 2025-03-12 PROCEDURE — 99213 OFFICE O/P EST LOW 20 MIN: CPT | Mod: S$PBB,,, | Performed by: PHYSICAL MEDICINE & REHABILITATION

## 2025-03-12 PROCEDURE — 99999 PR PBB SHADOW E&M-EST. PATIENT-LVL III: CPT | Mod: PBBFAC,,, | Performed by: PHYSICAL MEDICINE & REHABILITATION

## 2025-03-12 NOTE — PROGRESS NOTES
SUBJECTIVE:    Patient ID: Gabriel Salvador is a 40 y.o. female.    Chief Complaint: Follow-up    She is here to review her lumbar MRI done 03/07/2025 to evaluate her complaint of low back pain at the lumbosacral junction that radiates into the posterior portion of the right leg to the knee and the posterior portion of the left leg to the lateral ankle.      The MRI is summarized below:      FINDINGS:  Transitional lumbosacral anatomy with presumed partially sacralized L5 vertebral body with pseudoarticulation of the bilateral L5 transverse processes with sacrum and superimposed degenerative changes, right greater than left.     Alignment: Normal.     Vertebral column: Vertebral body heights are maintained. No acute fracture is identified; however, if trauma is suspected, a CT scan would be a more sensitive examination for fractures. No evidence of an aggressive marrow replacement process. Mild disc space narrowing at L5-S1.  Remainder of the disc spaces maintain normal height and signal.     Cord: Normal.  Conus terminates at L1.     Disc levels:     T11-12 and T12-L1.  Discs are normal in configuration.  No neural foraminal or spinal canal stenosis.     L1-L2: Disc is normal in configuration. Ligamentum flavum thickening.  No neural foraminal or spinal canal stenosis.     L2-L3: Disc is normal in configuration. Mild bilateral facet arthropathy.  Ligament flavum thickening.  No neural foraminal or spinal canal stenosis.     L3-L4: Mild circumferential disc bulge.  Mild bilateral facet arthropathy and ligamentum flavum thickening.   No significant neural foraminal or spinal canal stenosis.     L4-L5: Mild circumferential disc bulge.  Mild bilateral facet arthropathy and ligamentum flavum thickening.  Mild bilateral neural foraminal stenosis.  No spinal canal stenosis.     L5-S1: Transitional lumbosacral anatomy with partial sacralized L5 vertebral body.  Degenerative changes at the bilateral L5-S1  "pseudoarticulations with resultant severe far lateral right neural foraminal stenosis with suspected impingement upon the exiting right L5 nerve root.  Disc is normal in configuration. Mild bilateral facet arthropathy and ligamentum flavum thickening.  No spinal canal stenosis.     Paraspinal muscles & soft tissues: No significant abnormalities.  Bilateral follicular cysts, 0.4 cm on right 0.2 cm on the left, within the normal physiologic size range.     Impression:     1. Transitional lumbosacral anatomy with presumed partially sacralized L5 vertebral body with pseudoarticulation of the bilateral L5 transverse processes with the sacrum and superimposed degenerative changes, right greater than left, with resultant severe far lateral right neural foraminal stenosis with suspected impingement upon the exiting right L5 nerve root.  2. Mild degenerative changes of the lumbar spine elsewhere, as above, resulting in mild bilateral neural foraminal stenosis at L4-5.  No spinal canal stenosis at any lumbar level.        Clinically she is a little bit better in so much as she has not really been bothered with any radicular leg symptoms.  She says sometimes her legs feel numb but overall she can live with leg discomfort.  Her symptoms are 90% low back and 10% or less related to her legs.  Pain level currently is 7/10 but at times as high as 8/10 and interferes with her quality of life in terms of activities of daily living recreation and social activities.               Past Medical History:   Diagnosis Date    Abnormal Pap smear of cervix 2006    Asthma     Breast disorder      Social History[1]  Past Surgical History:   Procedure Laterality Date    BREAST BIOPSY Left 2011    COLPOSCOPY      laproscopy        Family History   Problem Relation Name Age of Onset    Diabetes Father       Vitals:    03/12/25 1524   Weight: 86 kg (189 lb 9.5 oz)   Height: 5' 4" (1.626 m)       Review of Systems   Constitutional:  Negative for " chills, diaphoresis, fatigue, fever and unexpected weight change.   HENT:  Negative for trouble swallowing.    Eyes:  Negative for visual disturbance.   Respiratory:  Negative for shortness of breath.    Cardiovascular:  Negative for chest pain.   Gastrointestinal:  Negative for abdominal pain, constipation, nausea and vomiting.   Genitourinary:  Negative for difficulty urinating.   Musculoskeletal:  Negative for arthralgias, back pain, gait problem, joint swelling, myalgias, neck pain and neck stiffness.   Neurological:  Negative for dizziness, speech difficulty, weakness, light-headedness, numbness and headaches.          Objective:      Physical Exam  Neurological:      Mental Status: She is alert and oriented to person, place, and time.             Assessment:       1. Chronic bilateral low back pain with bilateral sciatica           Plan:     I reassured her there are no worrisome findings on her MRI.  We talked about treatment options which include living with her ongoing pain versus additional therapy versus radiofrequency ablation or surgical intervention.  I think she would benefit most from medial branch blocks/radiofrequency ablation bilaterally L4-5 and L5-S1.  She wants to think about it.  I gave her a handout about the procedure.  She can let me know if she wants to proceed otherwise follow up here as needed.  Activity as tolerated.      Chronic bilateral low back pain with bilateral sciatica               [1]   Social History  Socioeconomic History    Marital status:    Tobacco Use    Smoking status: Never    Smokeless tobacco: Never   Substance and Sexual Activity    Alcohol use: No    Drug use: No    Sexual activity: Yes     Partners: Male     Birth control/protection: None     Social Drivers of Health     Financial Resource Strain: Low Risk  (2/17/2025)    Overall Financial Resource Strain (CARDIA)     Difficulty of Paying Living Expenses: Not hard at all   Food Insecurity: No Food  Insecurity (2/17/2025)    Hunger Vital Sign     Worried About Running Out of Food in the Last Year: Never true     Ran Out of Food in the Last Year: Never true   Transportation Needs: No Transportation Needs (2/17/2025)    PRAPARE - Transportation     Lack of Transportation (Medical): No     Lack of Transportation (Non-Medical): No   Physical Activity: Insufficiently Active (2/17/2025)    Exercise Vital Sign     Days of Exercise per Week: 3 days     Minutes of Exercise per Session: 30 min   Stress: Stress Concern Present (2/17/2025)    [employer] [place] [employer] - Occupational Stress Questionnaire     Feeling of Stress : Very much   Housing Stability: Low Risk  (2/17/2025)    Housing Stability Vital Sign     Unable to Pay for Housing in the Last Year: No     Number of Times Moved in the Last Year: 0     Homeless in the Last Year: No

## 2025-03-13 ENCOUNTER — OFFICE VISIT (OUTPATIENT)
Dept: ORTHOPEDICS | Facility: CLINIC | Age: 40
End: 2025-03-13
Payer: OTHER GOVERNMENT

## 2025-03-13 ENCOUNTER — PATIENT OUTREACH (OUTPATIENT)
Dept: ADMINISTRATIVE | Facility: HOSPITAL | Age: 40
End: 2025-03-13
Payer: OTHER GOVERNMENT

## 2025-03-13 VITALS — HEIGHT: 64 IN | WEIGHT: 189.63 LBS | BODY MASS INDEX: 32.37 KG/M2

## 2025-03-13 DIAGNOSIS — M17.0 PRIMARY OSTEOARTHRITIS OF KNEES, BILATERAL: Primary | ICD-10-CM

## 2025-03-13 PROCEDURE — 20610 DRAIN/INJ JOINT/BURSA W/O US: CPT | Mod: 50,PBBFAC,PO | Performed by: ORTHOPAEDIC SURGERY

## 2025-03-13 PROCEDURE — 99213 OFFICE O/P EST LOW 20 MIN: CPT | Mod: PBBFAC,PO | Performed by: ORTHOPAEDIC SURGERY

## 2025-03-13 PROCEDURE — 99999 PR PBB SHADOW E&M-EST. PATIENT-LVL III: CPT | Mod: PBBFAC,,, | Performed by: ORTHOPAEDIC SURGERY

## 2025-03-13 PROCEDURE — 99999PBSHW PR PBB SHADOW TECHNICAL ONLY FILED TO HB: Mod: JZ,PBBFAC,,

## 2025-03-13 PROCEDURE — 99499 UNLISTED E&M SERVICE: CPT | Mod: 25,S$PBB,, | Performed by: ORTHOPAEDIC SURGERY

## 2025-03-13 RX ADMIN — Medication 20 MG: at 02:03

## 2025-03-13 NOTE — PROGRESS NOTES
Past Medical History:   Diagnosis Date    Abnormal Pap smear of cervix 2006    Asthma     Breast disorder        Past Surgical History:   Procedure Laterality Date    BREAST BIOPSY Left 2011    COLPOSCOPY      laproscopy          Current Outpatient Medications   Medication Sig    albuterol (PROVENTIL/VENTOLIN HFA) 90 mcg/actuation inhaler Inhale 2 puffs into the lungs every 6 (six) hours as needed for Wheezing.    B.coagul,subtilis/inulin/vit C (CULTURELLE PROBIOTIC-PREBIOTIC ORAL)     betamethasone dipropionate 0.05 % cream Apply topically 2 (two) times daily. Use with Minoxidil    cetirizine (ZYRTEC) 10 MG tablet Take 1 tablet (10 mg total) by mouth once daily.    dicyclomine (BENTYL) 10 MG capsule Take 1 capsule (10 mg total) by mouth 3 (three) times daily as needed (Abdominal Cramping).    DULoxetine (CYMBALTA) 60 MG capsule Take 1 capsule (60 mg total) by mouth once daily.    fluticasone propionate (FLONASE) 50 mcg/actuation nasal spray 1 spray (50 mcg total) by Each Nostril route once daily.    loratadine 10 mg Cap     MELATONIN ORAL Take by mouth.    multivitamin capsule Take 1 capsule by mouth once daily.     No current facility-administered medications for this visit.       Review of patient's allergies indicates:   Allergen Reactions    Pollen extracts Other (See Comments)       Family History   Problem Relation Name Age of Onset    Diabetes Father         Social History     Socioeconomic History    Marital status:    Tobacco Use    Smoking status: Never    Smokeless tobacco: Never   Substance and Sexual Activity    Alcohol use: No    Drug use: No    Sexual activity: Yes     Partners: Male     Birth control/protection: None     Social Drivers of Health     Financial Resource Strain: Low Risk  (2/17/2025)    Overall Financial Resource Strain (CARDIA)     Difficulty of Paying Living Expenses: Not hard at all   Food Insecurity: No Food Insecurity (2/17/2025)    Hunger Vital Sign     Worried About  Running Out of Food in the Last Year: Never true     Ran Out of Food in the Last Year: Never true   Transportation Needs: No Transportation Needs (2/17/2025)    PRAPARE - Transportation     Lack of Transportation (Medical): No     Lack of Transportation (Non-Medical): No   Physical Activity: Insufficiently Active (2/17/2025)    Exercise Vital Sign     Days of Exercise per Week: 3 days     Minutes of Exercise per Session: 30 min   Stress: Stress Concern Present (2/17/2025)    [employer] - Occupational Stress Questionnaire     Feeling of Stress : Very much   Housing Stability: Low Risk  (2/17/2025)    Housing Stability Vital Sign     Unable to Pay for Housing in the Last Year: No     Number of Times Moved in the Last Year: 0     Homeless in the Last Year: No       Chief Complaint:   No chief complaint on file.      History of present illness:  39-year-old female seen for bilateral knee pain.  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  Pain started years ago.  Knee feels unstable.  Patient hears popping and rubbing.  Pain along the front of her knee.  She has tried over-the-counter braces which do not help.    Had an MRI done recently which just show some early chondromalacia of the patella.  Ordered some physical therapy at her last visit.  Has not really helped a whole lot.  We have done cortisone injections on her previously.  We to get viscosupplementation approved but her insurance does not cover it.        Review of Systems:  Musculoskeletal:  See HPI        Physical Examination:    Vital Signs:  There were no vitals filed for this visit.    There is no height or weight on file to calculate BMI.    This a well-developed, well nourished patient in no acute distress.  They are alert and oriented and cooperative to examination.  Pt. walks without an antalgic gait.      Examination of bilateral knees shows no rashes or erythema. There are no masses ecchymosis or effusion.  Patient has full range of motion from 0-130°. Patient is nontender to palpation over lateral joint line and nontender to palpation over the medial joint line.  Knee is stable to varus and valgus stress. 5 out of 5 motor strength. Palpable distal pulses. Intact light touch sensation. Negative Patellofemoral crepitus.  Mild lateral tilt      X-rays:  X-rays of the right knee is reviewed which shows mild lateral tilt of the patella    MRI of the left knee is reviewed and interpreted:1. There is trace fluid superficial to the patellar tendon likely reflecting mild peritendinitis.  2. Mild sprain of the MCL.  3. Focal near full thickness fissure of the medial patellar facet articular cartilage with adjacent subchondral cystic degeneration of the patella.    MRI of the right knee is reviewed and interpreted:1. Mild chondromalacia with the medial patellar facet  2. Trace amount of fluid at the suprapatellar recess.  3. Degenerative geode formation posteriorly at the medial tibial plateau.     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  I injected both knees with Euflexxa 2 of 3.  Follow up next week.    The patient has tried a self guided exercise program that has included walking without significant improvement. Minimal relief with tylenol or OTC Nsaids. Reports less than 8 weeks relief with IA steroid injection. Kellgren Acosta scale of 2. They are not receiving another HA injectable at this time. I will precert for gel injection.               All previous pertinent notes including ER visits, physical therapy visits, other orthopedic visits as well as other care for the same musculoskeletal problem were reviewed.  All pertinent lab values and previous imaging was reviewed pertinent to the current visit.    This note was created using [employer] voice recognition software that occasionally misinterpreted phrases or words.    Consult note  is delivered via Epic messaging service.

## 2025-03-13 NOTE — PROCEDURES
Large Joint Aspiration/Injection: bilateral knee    Date/Time: 3/13/2025 2:15 PM    Performed by: Rachid Duarte MD  Authorized by: Rachid Duarte MD    Consent Done?:  Yes (Verbal)  Indications:  Pain  Site marked: the procedure site was marked    Timeout: prior to procedure the correct patient, procedure, and site was verified      Details:  Needle Size:  20 G  Approach:  Anterolateral  Location:  Knee  Laterality:  Bilateral  Site:  Bilateral knee  Medications (Right):  20 mg sodium hyaluronate (EUFLEXXA) 10 mg/mL(mw 2.4 -3.6 million)  Medications (Left):  20 mg sodium hyaluronate (EUFLEXXA) 10 mg/mL(mw 2.4 -3.6 million)  Patient tolerance:  Patient tolerated the procedure well with no immediate complications

## 2025-03-17 ENCOUNTER — HOSPITAL ENCOUNTER (OUTPATIENT)
Dept: RADIOLOGY | Facility: HOSPITAL | Age: 40
Discharge: HOME | End: 2025-03-17
Attending: STUDENT IN AN ORGANIZED HEALTH CARE EDUCATION/TRAINING PROGRAM
Payer: OTHER GOVERNMENT

## 2025-03-17 ENCOUNTER — PATIENT MESSAGE (OUTPATIENT)
Dept: FAMILY MEDICINE | Facility: CLINIC | Age: 40
End: 2025-03-17
Payer: OTHER GOVERNMENT

## 2025-03-17 ENCOUNTER — RESULTS FOLLOW-UP (OUTPATIENT)
Dept: FAMILY MEDICINE | Facility: CLINIC | Age: 40
End: 2025-03-17

## 2025-03-17 DIAGNOSIS — R92.8 FOLLOW-UP EXAMINATION OF ABNORMAL MAMMOGRAM: ICD-10-CM

## 2025-03-17 PROCEDURE — 77065 DX MAMMO INCL CAD UNI: CPT | Mod: 26,LT,, | Performed by: RADIOLOGY

## 2025-03-17 PROCEDURE — 76642 ULTRASOUND BREAST LIMITED: CPT | Mod: 26,LT,, | Performed by: RADIOLOGY

## 2025-03-17 PROCEDURE — 76642 ULTRASOUND BREAST LIMITED: CPT | Mod: TC,LT

## 2025-03-17 PROCEDURE — 77061 BREAST TOMOSYNTHESIS UNI: CPT | Mod: 26,LT,, | Performed by: RADIOLOGY

## 2025-03-17 PROCEDURE — 77061 BREAST TOMOSYNTHESIS UNI: CPT | Mod: TC,LT

## 2025-03-18 NOTE — TELEPHONE ENCOUNTER
Patient is scheduled to see Dr Navarro GYN on 4/8/2025   Referral approved for routine well woman with pap

## 2025-03-18 NOTE — TELEPHONE ENCOUNTER
Response:     Is she planning to go to an appt with her Gyn? If so, this will addressed with this specialist.     Dr. Murillo

## 2025-03-18 NOTE — TELEPHONE ENCOUNTER
Please inform patient that she should share this concern at her upcoming appointment with Dr. Navarro

## 2025-03-20 ENCOUNTER — OFFICE VISIT (OUTPATIENT)
Dept: ORTHOPEDICS | Facility: CLINIC | Age: 40
End: 2025-03-20
Payer: OTHER GOVERNMENT

## 2025-03-20 VITALS — RESPIRATION RATE: 16 BRPM

## 2025-03-20 DIAGNOSIS — M17.0 PRIMARY OSTEOARTHRITIS OF KNEES, BILATERAL: Primary | ICD-10-CM

## 2025-03-20 PROCEDURE — 99999 PR PBB SHADOW E&M-EST. PATIENT-LVL III: CPT | Mod: PBBFAC,,, | Performed by: ORTHOPAEDIC SURGERY

## 2025-03-20 PROCEDURE — 99213 OFFICE O/P EST LOW 20 MIN: CPT | Mod: PBBFAC,PO | Performed by: ORTHOPAEDIC SURGERY

## 2025-03-20 PROCEDURE — 99999PBSHW PR PBB SHADOW TECHNICAL ONLY FILED TO HB: Mod: JZ,PBBFAC,,

## 2025-03-20 PROCEDURE — 20610 DRAIN/INJ JOINT/BURSA W/O US: CPT | Mod: 50,PBBFAC,PO | Performed by: ORTHOPAEDIC SURGERY

## 2025-03-20 PROCEDURE — 99499 UNLISTED E&M SERVICE: CPT | Mod: 25,S$PBB,, | Performed by: ORTHOPAEDIC SURGERY

## 2025-03-20 RX ADMIN — Medication 20 MG: at 02:03

## 2025-03-20 NOTE — PROGRESS NOTES
Past Medical History:   Diagnosis Date    Abnormal Pap smear of cervix 2006    Asthma     Breast disorder        Past Surgical History:   Procedure Laterality Date    BREAST BIOPSY Left 2011    COLPOSCOPY      laproscopy          Current Outpatient Medications   Medication Sig    albuterol (PROVENTIL/VENTOLIN HFA) 90 mcg/actuation inhaler Inhale 2 puffs into the lungs every 6 (six) hours as needed for Wheezing.    B.coagul,subtilis/inulin/vit C (CULTURELLE PROBIOTIC-PREBIOTIC ORAL)     betamethasone dipropionate 0.05 % cream Apply topically 2 (two) times daily. Use with Minoxidil    cetirizine (ZYRTEC) 10 MG tablet Take 1 tablet (10 mg total) by mouth once daily.    DULoxetine (CYMBALTA) 60 MG capsule Take 1 capsule (60 mg total) by mouth once daily.    fluticasone propionate (FLONASE) 50 mcg/actuation nasal spray 1 spray (50 mcg total) by Each Nostril route once daily.    loratadine 10 mg Cap     MELATONIN ORAL Take by mouth.    multivitamin capsule Take 1 capsule by mouth once daily.     No current facility-administered medications for this visit.       Review of patient's allergies indicates:   Allergen Reactions    Pollen extracts Other (See Comments)       Family History   Problem Relation Name Age of Onset    Diabetes Father         Social History     Socioeconomic History    Marital status:    Tobacco Use    Smoking status: Never    Smokeless tobacco: Never   Substance and Sexual Activity    Alcohol use: No    Drug use: No    Sexual activity: Yes     Partners: Male     Birth control/protection: None     Social Drivers of Health     Financial Resource Strain: Low Risk  (2/17/2025)    Overall Financial Resource Strain (CARDIA)     Difficulty of Paying Living Expenses: Not hard at all   Food Insecurity: No Food Insecurity (2/17/2025)    Hunger Vital Sign     Worried About Running Out of Food in the Last Year: Never true     Ran Out of Food in the Last Year: Never true   Transportation Needs: No  Transportation Needs (2/17/2025)    PRAPARE - Transportation     Lack of Transportation (Medical): No     Lack of Transportation (Non-Medical): No   Physical Activity: Insufficiently Active (2/17/2025)    Exercise Vital Sign     Days of Exercise per Week: 3 days     Minutes of Exercise per Session: 30 min   Stress: Stress Concern Present (2/17/2025)    [employer] [place] [employer] - Occupational Stress Questionnaire     Feeling of Stress : Very much   Housing Stability: Low Risk  (2/17/2025)    Housing Stability Vital Sign     Unable to Pay for Housing in the Last Year: No     Number of Times Moved in the Last Year: 0     Homeless in the Last Year: No       Chief Complaint:   Chief Complaint   Patient presents with    Injections     BILAT EUFLEXXA 3/3       History of present illness:  39-year-old female seen for bilateral knee pain.  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  Pain started years ago.  Knee feels unstable.  Patient hears popping and rubbing.  Pain along the front of her knee.  She has tried over-the-counter braces which do not help.    Had an MRI done recently which just show some early chondromalacia of the patella.  Ordered some physical therapy at her last visit.  Has not really helped a whole lot.  We have done cortisone injections on her previously.  We to get viscosupplementation approved but her insurance does not cover it.        Review of Systems:  Musculoskeletal:  See HPI        Physical Examination:    Vital Signs:    Vitals:    03/20/25 1423   Resp: 16       There is no height or weight on file to calculate BMI.    This a well-developed, well nourished patient in no acute distress.  They are alert and oriented and cooperative to examination.  Pt. walks without an antalgic gait.      Examination of bilateral knees shows no rashes or erythema. There are no masses ecchymosis or effusion. Patient has full range of motion from 0-130°. Patient is nontender to palpation  over lateral joint line and nontender to palpation over the medial joint line.  Knee is stable to varus and valgus stress. 5 out of 5 motor strength. Palpable distal pulses. Intact light touch sensation. Negative Patellofemoral crepitus.  Mild lateral tilt      X-rays:  X-rays of the right knee is reviewed which shows mild lateral tilt of the patella    MRI of the left knee is reviewed and interpreted:1. There is trace fluid superficial to the patellar tendon likely reflecting mild peritendinitis.  2. Mild sprain of the MCL.  3. Focal near full thickness fissure of the medial patellar facet articular cartilage with adjacent subchondral cystic degeneration of the patella.    MRI of the right knee is reviewed and interpreted:1. Mild chondromalacia with the medial patellar facet  2. Trace amount of fluid at the suprapatellar recess.  3. Degenerative geode formation posteriorly at the medial tibial plateau.     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  I injected both knees with Euflexxa 3 of 3.  Follow up as needed.    The patient has tried a self guided exercise program that has included walking without significant improvement. Minimal relief with tylenol or OTC Nsaids. Reports less than 8 weeks relief with IA steroid injection. Kellgren Acosta scale of 2. They are not receiving another HA injectable at this time. I will precert for gel injection.               All previous pertinent notes including ER visits, physical therapy visits, other orthopedic visits as well as other care for the same musculoskeletal problem were reviewed.  All pertinent lab values and previous imaging was reviewed pertinent to the current visit.    This note was created using [employer] voice recognition software that occasionally misinterpreted phrases or words.    Consult note is delivered via Epic messaging service.

## 2025-03-20 NOTE — PROCEDURES
Large Joint Aspiration/Injection: bilateral knee    Date/Time: 3/20/2025 2:15 PM    Performed by: Rachid Duarte MD  Authorized by: Rachid Duarte MD    Consent Done?:  Yes (Verbal)  Indications:  Pain  Site marked: the procedure site was marked    Timeout: prior to procedure the correct patient, procedure, and site was verified      Details:  Needle Size:  20 G  Approach:  Anterolateral  Location:  Knee  Laterality:  Bilateral  Site:  Bilateral knee  Medications (Right):  20 mg sodium hyaluronate (EUFLEXXA) 10 mg/mL(mw 2.4 -3.6 million)  Medications (Left):  20 mg sodium hyaluronate (EUFLEXXA) 10 mg/mL(mw 2.4 -3.6 million)  Patient tolerance:  Patient tolerated the procedure well with no immediate complications

## 2025-04-08 ENCOUNTER — OFFICE VISIT (OUTPATIENT)
Dept: OBSTETRICS AND GYNECOLOGY | Facility: CLINIC | Age: 40
End: 2025-04-08
Payer: OTHER GOVERNMENT

## 2025-04-08 VITALS
WEIGHT: 152.88 LBS | BODY MASS INDEX: 26.1 KG/M2 | DIASTOLIC BLOOD PRESSURE: 84 MMHG | HEIGHT: 64 IN | SYSTOLIC BLOOD PRESSURE: 128 MMHG

## 2025-04-08 DIAGNOSIS — R10.2 CHRONIC PELVIC PAIN IN FEMALE: Primary | ICD-10-CM

## 2025-04-08 DIAGNOSIS — Z12.4 ENCOUNTER FOR SCREENING FOR CERVICAL CANCER: ICD-10-CM

## 2025-04-08 DIAGNOSIS — G89.29 CHRONIC PELVIC PAIN IN FEMALE: Primary | ICD-10-CM

## 2025-04-08 PROCEDURE — 99215 OFFICE O/P EST HI 40 MIN: CPT | Mod: S$PBB,,, | Performed by: GENERAL PRACTICE

## 2025-04-08 PROCEDURE — 99999 PR PBB SHADOW E&M-EST. PATIENT-LVL III: CPT | Mod: PBBFAC,,, | Performed by: GENERAL PRACTICE

## 2025-04-08 PROCEDURE — 99213 OFFICE O/P EST LOW 20 MIN: CPT | Mod: PBBFAC,PO | Performed by: GENERAL PRACTICE

## 2025-04-08 NOTE — PROGRESS NOTES
Background:  2024  39 y.o. for WWE / establishing care.  Multiple non-GYN concerns. Has CPP with Hx of pain scope and prior myomectomy.   --Records request from Dr. Jarrett's office        ASSESSMENT AND PLAN   2025  40 y.o.  for PAP and evaluation of CPP.  Discussed the pattern of her pain doesn't sound gynecologic in nature.  Discussed if her pain didn't improve on Lupron it's very unlikely due to endometriosis.  Discussed she could have scarring involving her reproductive organs, especially after having had a myomectomy - only way to know would be with surgery.  Discussed hysterectomy would be definitive way to deal with periods but can't guarantee resolution of pain symptoms.  Reassured re normal MRI findings.  Discussed sense of significant muscle tension of the pelvic floor on exam.    Rads: pelvic US  Consider pelvic floor PT  Cervical Cancer screening: f/u results of PAP / HPV --> if both negative then next screen in 5 yrs  HPV Vaccine: complete  Mammogram: up to date  Encouraged self breast awareness; RTC for breast concerns  Return to clinic: for periodic GYN wellness exam, every 1-3 years per patient preference and comfort level    Leslie L Weeks, MD [facility]    SUBJECTIVE   2025  Gabriel Presleyanca RadhaRichar is a 40 y.o. here for follow-up of chronic pelvic pain.  Recent MRI noted ovarian follicular cyts.  Also, we never received last PAP from prior GYN so will collect today.    Was having chronic abd pain in .  Was in  at the time, so continuity was a problem.  Did what sounds like Lupron.  Did not have improvement in pain with Lupron.  Did for several months.  Myomectomy was done thinking it might be part of her pain.  Working on getting her records.    Has daily pain.  Worst in the morning and improves with movement, going through the day.  Bad again in the evenings.  More intensity the past few weeks.  Sleeps with a heating pad - primarily for  her back but also using on her abdomen.  Better with ibuprofen.  Pain is low abdomen.  Sometimes left, sometimes right.  No associated sxs such as nausea, fever.  Generally runs cold.    Did DepoProvera late-teens into 20's. Had irregular / problematic bleeding.  ROSITA made her feel nauseous. Also didn't like NuvaRing - could feel it.    G'sP's:   LMP: Patient's last menstrual period was 2025 (approximate).  Relationship:  10 mo and sexually active  Contraception: Vasectomy  PAP Hx: had colposcopy ~ with normal PAP's since  LAST PAP: 2019: PAP neg   HPV Vaccine: complete  MMG (24) = also had Dx MMG and US done; recommend 6-mo f/u      OBJECTIVE   PHYSICAL EXAM  Vitals:    25 1447   BP: 128/84     GEN = alert/oriented, nad, pleasant  HEENT = sclera anicteric, EOM grossly normal  BREASTS = declined, no concerns  CV = BP and HR as per vitals  PULM = normal respiratory effort   =      External: NEFG     Vagina: no lesions, normal, urethral meatus normal     Discharge: normal and physiologic     Cervix: normal-appearing, PAP smear obtained     Bimanual: uterus normal size, no adnexal masses or tenderness, mobile organs, no levator ttp, no urethral or bladder ttp, mild uterine tenderness, introitus subjectively tight    LABS AND RADS    Lab Results   Component Value Date    WBC 4.22 2024    HGB 13.1 2024    HCT 41.7 2024     2024    MCV 91 2024      Lab Results   Component Value Date    HEPCAB Non-reactive 2024    [id number] Non-reactive 2024     Lab Results   Component Value Date    TSH 0.878 2024      MRI LUMBAR SPINE (3/7/2025)  Bilateral follicular cysts, 0.4 cm on right 0.2 cm on the left, within the normal physiologic size range.     HISTORY   Date taken or verified: 2025     GYNECOLOGIC HISTORY  PAP Hx: had colposcopy ~ with normal PAP's since  Genital HSV: No  Other STD Hx: denies    Menarche: 11yoa  Bleeding  -- #Days Bleedin-6 / # Heavy Days: 4 / Product Change on heavy days: 4-5x / Intermenstrual Bleeding: No / Cycle: regular every 28-30 days  Pain -- Dysmenorrhea: lifelong / Non-menstrual pelvic pain: Yes / Dyspareunia:  sometimes  Other -- Vasomotor Sxs: denies / Vaginal dryness: yes    OBSTETRIC HISTORY  G0    SOCIAL HISTORY  Lives with:  and stepdaughter  Smoker: non-smoker / Alcohol: denies / Drugs: denies  Domestic Violence: No  Occupation:  works in [employer] , , out in the field by herself , physically demanding job    FAMILY HISTORY  BLEEDING or CLOTTING DISORDERS: none  BREAST CA: none / UTERINE CA: none / OVARIAN CA: none  COLON CA: none     PAST MEDICAL HISTORY  -------------------------------------    Abnormal Pap smear of cervix    Anxiety and depression    Asthma    Breast disorder    Chronic abdominal pain     PAST SURGICAL HISTORY  ----------------------------    Breast biopsy    Colposcopy    Exploratory laparotomy with uterine myomectomy    2007    Laproscopy     for pain     ALLERGIES  Review of patient's allergies indicates:   Allergen Reactions    Pollen extracts Other (See Comments)     MEDICATIONS  Current Outpatient Medications   Medication Instructions    albuterol (PROVENTIL/VENTOLIN HFA) 90 mcg/actuation inhaler 2 puffs, Inhalation, Every 6 hours PRN    B.coagul,subtilis/inulin/vit C (CULTURELLE PROBIOTIC-PREBIOTIC ORAL)     betamethasone dipropionate 0.05 % cream Topical (Top), 2 times daily, Use with Minoxidil    cetirizine (ZYRTEC) 10 mg, Oral, Daily    DULoxetine (CYMBALTA) 60 mg, Oral, Daily    fluticasone propionate (FLONASE) 50 mcg, Each Nostril, Daily    loratadine 10 mg Cap     MELATONIN ORAL Take by mouth.    multivitamin capsule 1 capsule, Daily        In excess of 40 minutes total time spent for evaluation and management services. Time included elements of the following: time spent preparing to see patient, obtaining and reviewing separately obtained  history, exam, evaluation, counseling and education of patient/family member or care giver, documenting in the HMR, independently interpreting results and communication of results, coordination of care ordering medications, tests, or procedures, referral and communication to other health care professionals.

## 2025-04-08 NOTE — PATIENT INSTRUCTIONS
PAP SMEARS:    Screening for cervical cancer involves 1 or 2 parts based on your age and situation:  PAP smear (looking at the cells from your cervix)  HPV testing (checking whether you have the Human Papilloma Virus in your cervical cells)    These test results often come back at different times, but you won't hear from me until they BOTH are back since both pieces of information are important in deciding what to do next.    Soif you see a PAP result in [facility] (or an HPV result) that is abnormal, please allow another 7-10 business days before you send a message asking what to do next.  I am waiting for the other test result before I make a recommendation.    If both tests are resulted in [email or web address], you should hear from me within 2-3 business days.    Abnormal tests will be followed up in one of two ways:  Repeat testing of PAP and/or HPV  Colposcopy (examination and biopsy of your cervix in the office using staining solutions and magnification)     ULTRASOUND, MAMMOGRAMS, CT SCANS:  If the treatment plan is simple or unchanged based on the study result, I'll probably send a message in [employer]    If the treatment plan is complex and/or the study result is concerning, I'll call you to discuss.  In some cases, I'll have my staff schedule an appointment for you to come in to discuss things in person.

## 2025-04-21 ENCOUNTER — TELEPHONE (OUTPATIENT)
Dept: OBSTETRICS AND GYNECOLOGY | Facility: CLINIC | Age: 40
End: 2025-04-21
Payer: OTHER GOVERNMENT

## 2025-04-21 NOTE — TELEPHONE ENCOUNTER
Spoke with pt to answer her questions in regards to her referral through [facility] for her ultrasound. Pt states we need to submit the referral through fax, pt informed we usually receive the approval from [facility] and not sending anything in. Pt states that's what she was told. Pt informed will work on it, and reach back out to her. Pt also wanted to let us know she has some vaginal irritation from the coconut oil Dr. Navarro suggest but she took OTC meds. Pt will reach out if those meds doesn't help. Understanding verbalized.

## 2025-04-21 NOTE — TELEPHONE ENCOUNTER
----- Message from Garth sent at 4/21/2025  9:26 AM CDT -----  Type: Needs Medical AdviceWho Called:  [place] Call Back Number: [phone] Information: pt is calling the office to let the Dr know that the organic coconut oil caused her some vaginal irritation. She is currently using OTC meds, Monistat 1. Please call back to advise. Thanks!

## 2025-04-24 ENCOUNTER — RESULTS FOLLOW-UP (OUTPATIENT)
Dept: OBSTETRICS AND GYNECOLOGY | Facility: CLINIC | Age: 40
End: 2025-04-24

## 2025-07-31 ENCOUNTER — TELEPHONE (OUTPATIENT)
Dept: DERMATOLOGY | Facility: CLINIC | Age: 40
End: 2025-07-31
Payer: OTHER GOVERNMENT

## 2025-07-31 NOTE — TELEPHONE ENCOUNTER
Copied from CRM #[id number]. Topic: Appointments - Appointment Rescheduling  >> Jul 31, 2025  9:45 AM Courtney wrote:  Type:  Reschedule Appointment Request    Caller is requesting to reschedule appointment.      Name of Caller:Pt    When is the first available appointment?8/4/2025    Symptoms:Hair loss    Would the patient rather a call back or a response via [facility]? Call back    Best Call Back Number:[phone]      Additional Information: Pt states she has been waiting on appt but has a family emergency to tend to and wont be able to make her appt on 8/4. She is calling to see if she can possible get in on 8/5 or 8/6     Please call Back to advise. Thanks!

## 2025-07-31 NOTE — TELEPHONE ENCOUNTER
Called and lvm for patient to call back    Copied from CRM #[id number]. Topic: Appointments - Appointment Rescheduling  >> Jul 31, 2025  9:45 AM Courtney wrote:  Type:  Reschedule Appointment Request    Caller is requesting to reschedule appointment.      Name of Caller:Pt    When is the first available appointment?8/4/2025    Symptoms:Hair loss    Would the patient rather a call back or a response via [facility]? Call back    Best Call Back Number:[phone]      Additional Information: Pt states she has been waiting on appt but has a family emergency to tend to and wont be able to make her appt on 8/4. She is calling to see if she can possible get in on 8/5 or 8/6     Please call Back to advise. Thanks!

## 2025-08-06 ENCOUNTER — TELEPHONE (OUTPATIENT)
Dept: DERMATOLOGY | Facility: CLINIC | Age: 40
End: 2025-08-06
Payer: OTHER GOVERNMENT

## 2025-08-06 ENCOUNTER — OFFICE VISIT (OUTPATIENT)
Dept: FAMILY MEDICINE | Facility: CLINIC | Age: 40
End: 2025-08-06
Payer: OTHER GOVERNMENT

## 2025-08-06 ENCOUNTER — TELEPHONE (OUTPATIENT)
Dept: FAMILY MEDICINE | Facility: CLINIC | Age: 40
End: 2025-08-06
Payer: OTHER GOVERNMENT

## 2025-08-06 DIAGNOSIS — L90.5 ABNORMAL SCARRING OF SKIN: Primary | ICD-10-CM

## 2025-08-06 PROCEDURE — 98005 SYNCH AUDIO-VIDEO EST LOW 20: CPT | Mod: 95,,,

## 2025-08-06 NOTE — TELEPHONE ENCOUNTER
Copied from CRM #[id number]. Topic: Appointments - Appointment Access  >> Aug 6, 2025 11:23 AM Katy wrote:  Type:  Sooner Appointment Request    Caller is requesting a sooner appointment.  Caller declined first available appointment listed below.  Caller will not accept being placed on the waitlist and is requesting a message be sent to doctor.    Name of Caller:  Pt   When is the first available appointment?  NA   Symptoms:  Hair loss   Would the patient rather a call back or a response via [facility]? Call   Best Call Back Number:  [phone]    Additional Information:  Pt asking to reschedule appt and is hoping Sep or Oct is avail. Pls call back   [place]

## 2025-08-06 NOTE — TELEPHONE ENCOUNTER
I spoke with pt via phone. Pt states that she is needing an updated derm referral. Will see NP Rossconnor virtually today at 1500.     Copied from CRM #[id number]. Topic: General Inquiry - Patient Advice  >> Aug 6, 2025 11:28 AM Katy wrote:  Type: Needs Medical Advice  Who Called:  Pt     Best Call Back Number: [phone]    Additional Information: Pt asking if she can get a referral renewed for derm w/ her . Pt currently has no pcp. Pls call back and advise

## 2025-08-06 NOTE — TELEPHONE ENCOUNTER
Patient needed to reschedule appt, holding 9/30 at 9:45 am for patient. Needs new referral working on getting.

## 2025-08-06 NOTE — PROGRESS NOTES
Subjective:       Patient ID: Gabriel Salvador is a 40 y.o. female.  The patient location is: [place], [place]  The chief complaint leading to consultation is: Dermatology referral    Visit type: audiovisual    Face to Face time with patient: 5 minutes  10 minutes of total time spent on the encounter, which includes face to face time and non-face to face time preparing to see the patient (eg, review of tests), Obtaining and/or reviewing separately obtained history, Documenting clinical information in the electronic or other health record, Independently interpreting results (not separately reported) and communicating results to the patient/family/caregiver, or Care coordination (not separately reported).         Each patient to whom he or she provides medical services by telemedicine is:  (1) informed of the relationship between the physician and patient and the respective role of any other health care provider with respect to management of the patient; and (2) notified that he or she may decline to receive medical services by telemedicine and may withdraw from such care at any time.      Chief Complaint: No chief complaint on file.    Patient presents virtually to the clinic for a dermatology referral.     Patient Active Problem List:     Routine gynecological examination     Uterine leiomyoma     Acute vulvitis     Vaginal discharge     Pap smear for cervical cancer screening     Decreased strength of lower extremity     Lumbar pain     CAMILLA (generalized anxiety disorder)    She has scarring on her neck and her dermatology referral .     Patient educated on plan of care, verbalized understanding.        Review of Systems   Constitutional:  Negative for activity change, appetite change, chills, diaphoresis and fever.   HENT:  Negative for congestion, ear pain, postnasal drip, sinus pressure, sneezing and sore throat.    Eyes:  Negative for pain, discharge, redness and itching.   Respiratory:  Negative  for apnea, cough, chest tightness, shortness of breath and wheezing.    Cardiovascular:  Negative for chest pain and leg swelling.   Gastrointestinal:  Negative for abdominal distention, abdominal pain, constipation, diarrhea, nausea and vomiting.   Genitourinary:  Negative for difficulty urinating, dysuria, flank pain and frequency.   Skin:  Negative for color change, rash and wound.   Neurological:  Negative for dizziness.   All other systems reviewed and are negative.      Problem List[1]    Objective:      Physical Exam  Constitutional:       General: She is not in acute distress.     Appearance: Normal appearance. She is not ill-appearing.   HENT:      Head: Normocephalic.   Eyes:      Conjunctiva/sclera: Conjunctivae normal.      Pupils: Pupils are equal, round, and reactive to light.      Comments: As seen on virtual visit   Pulmonary:      Effort: Pulmonary effort is normal. No respiratory distress.   Musculoskeletal:      Cervical back: Normal range of motion and neck supple.   Skin:     General: Skin is warm and dry.   Neurological:      General: No focal deficit present.      Mental Status: She is alert and oriented to person, place, and time.   Psychiatric:         Mood and Affect: Mood normal.         Behavior: Behavior normal.         Lab Results   Component Value Date    WBC 4.22 08/30/2024    HGB 13.1 08/30/2024    HCT 41.7 08/30/2024     08/30/2024    CHOL 159 08/30/2024    TRIG 52 08/30/2024    HDL 51 08/30/2024    ALT 10 08/30/2024    AST 20 08/30/2024     08/30/2024    K 4.2 08/30/2024     08/30/2024    CREATININE 1.0 08/30/2024    BUN 13 08/30/2024    CO2 26 08/30/2024    TSH 0.878 08/30/2024    HGBA1C 5.2 08/30/2024     The 10-year ASCVD risk score (Trista RUFF, et al., 2019) is: 0.7%    Values used to calculate the score:      Age: 40 years      Sex: Female      Is Non- : Yes      Diabetic: No      Tobacco smoker: No      Systolic Blood Pressure: 128  mmHg      Is BP treated: No      HDL Cholesterol: 51 mg/dL      Total Cholesterol: 159 mg/dL    Assessment:       1. Abnormal scarring of skin        Plan:       1. Abnormal scarring of skin  -     Ambulatory referral/consult to Dermatology; Future; Expected date: 08/13/2025       Follow up if symptoms worsen or fail to improve.      Future Appointments       Date Provider Specialty Appt Notes    9/30/2025 Sonia Rogers MD Dermatology hair loss                    [1]   Patient Active Problem List  Diagnosis    Routine gynecological examination    Uterine leiomyoma    Acute vulvitis    Vaginal discharge    Pap smear for cervical cancer screening    Decreased strength of lower extremity    Lumbar pain    CAMILLA (generalized anxiety disorder)

## 2025-08-07 ENCOUNTER — TELEPHONE (OUTPATIENT)
Dept: DERMATOLOGY | Facility: CLINIC | Age: 40
End: 2025-08-07
Payer: OTHER GOVERNMENT

## 2025-08-07 NOTE — PATIENT INSTRUCTIONS

## 2025-08-07 NOTE — TELEPHONE ENCOUNTER
Copied from CRM #[id number]. Topic: General Inquiry - Patient Advice  >> Aug 6, 2025  4:28 PM Julissa wrote:  Type:  Sooner Apoointment Request    Caller is requesting a sooner appointment.  Caller declined first available appointment listed below.  Caller will not accept being placed on the waitlist and is requesting a message be sent to doctor.  Name of Caller:pt   When is the first available appointment?n/a  Symptoms: n/a  Would the patient rather a call back or a response via [facility]? [facility]  Best Call Back Number:[phone]  Additional Information: pt said she got the referral she spoke to someone earlier that was holding an appt for sept 30th for 9 or 9:30 for the pt to have the appt please call back

## 2025-09-02 DIAGNOSIS — R92.8 ABNORMAL MAMMOGRAM: Primary | ICD-10-CM
